# Patient Record
Sex: FEMALE | Race: WHITE | NOT HISPANIC OR LATINO | Employment: OTHER | URBAN - NONMETROPOLITAN AREA
[De-identification: names, ages, dates, MRNs, and addresses within clinical notes are randomized per-mention and may not be internally consistent; named-entity substitution may affect disease eponyms.]

---

## 2017-03-08 RX ORDER — AMLODIPINE BESYLATE 5 MG/1
TABLET ORAL
Qty: 30 TABLET | Refills: 3 | Status: SHIPPED | OUTPATIENT
Start: 2017-03-08 | End: 2017-08-03 | Stop reason: SDUPTHER

## 2017-04-13 RX ORDER — CARVEDILOL 6.25 MG/1
TABLET ORAL
Qty: 60 TABLET | Refills: 5 | Status: SHIPPED | OUTPATIENT
Start: 2017-04-13 | End: 2019-05-28

## 2017-05-15 RX ORDER — LISINOPRIL 20 MG/1
TABLET ORAL
Qty: 30 TABLET | Refills: 11 | OUTPATIENT
Start: 2017-05-15

## 2017-05-19 RX ORDER — LISINOPRIL 20 MG/1
TABLET ORAL
Qty: 30 TABLET | Refills: 11 | Status: SHIPPED | OUTPATIENT
Start: 2017-05-19 | End: 2017-09-21 | Stop reason: SDUPTHER

## 2017-08-03 RX ORDER — AMLODIPINE BESYLATE 5 MG/1
5 TABLET ORAL DAILY
Qty: 30 TABLET | Refills: 0 | Status: SHIPPED | OUTPATIENT
Start: 2017-08-03 | End: 2017-09-05 | Stop reason: SDUPTHER

## 2017-09-05 ENCOUNTER — TELEPHONE (OUTPATIENT)
Dept: INTERNAL MEDICINE | Facility: CLINIC | Age: 82
End: 2017-09-05

## 2017-09-05 RX ORDER — AMLODIPINE BESYLATE 5 MG/1
TABLET ORAL
Qty: 30 TABLET | Refills: 0 | Status: SHIPPED | OUTPATIENT
Start: 2017-09-05 | End: 2017-09-21

## 2017-09-05 RX ORDER — AMLODIPINE BESYLATE 5 MG/1
5 TABLET ORAL DAILY
Qty: 15 TABLET | Refills: 0 | Status: SHIPPED | OUTPATIENT
Start: 2017-09-05 | End: 2017-09-21 | Stop reason: SDUPTHER

## 2017-09-21 ENCOUNTER — OFFICE VISIT (OUTPATIENT)
Dept: INTERNAL MEDICINE | Facility: CLINIC | Age: 82
End: 2017-09-21

## 2017-09-21 VITALS
TEMPERATURE: 98.9 F | RESPIRATION RATE: 14 BRPM | SYSTOLIC BLOOD PRESSURE: 130 MMHG | HEIGHT: 63 IN | DIASTOLIC BLOOD PRESSURE: 80 MMHG | BODY MASS INDEX: 24.1 KG/M2 | WEIGHT: 136 LBS | HEART RATE: 74 BPM | OXYGEN SATURATION: 97 %

## 2017-09-21 DIAGNOSIS — E78.5 HYPERLIPIDEMIA, UNSPECIFIED HYPERLIPIDEMIA TYPE: ICD-10-CM

## 2017-09-21 DIAGNOSIS — N30.10 CHRONIC INTERSTITIAL CYSTITIS: ICD-10-CM

## 2017-09-21 DIAGNOSIS — I10 ESSENTIAL HYPERTENSION: ICD-10-CM

## 2017-09-21 DIAGNOSIS — K21.9 GASTROESOPHAGEAL REFLUX DISEASE WITHOUT ESOPHAGITIS: ICD-10-CM

## 2017-09-21 DIAGNOSIS — R13.14 PHARYNGOESOPHAGEAL DYSPHAGIA: ICD-10-CM

## 2017-09-21 DIAGNOSIS — M81.0 OSTEOPOROSIS: ICD-10-CM

## 2017-09-21 DIAGNOSIS — R42 DIZZINESS: ICD-10-CM

## 2017-09-21 DIAGNOSIS — I67.1 CEREBRAL ANEURYSM: Primary | ICD-10-CM

## 2017-09-21 PROCEDURE — 99214 OFFICE O/P EST MOD 30 MIN: CPT | Performed by: INTERNAL MEDICINE

## 2017-09-21 RX ORDER — AMLODIPINE BESYLATE 5 MG/1
5 TABLET ORAL DAILY
Qty: 90 TABLET | Refills: 3 | Status: SHIPPED | OUTPATIENT
Start: 2017-09-21 | End: 2018-10-05 | Stop reason: SDUPTHER

## 2017-09-21 RX ORDER — LISINOPRIL 20 MG/1
20 TABLET ORAL DAILY
Qty: 90 TABLET | Refills: 3 | Status: SHIPPED | OUTPATIENT
Start: 2017-09-21 | End: 2018-10-05 | Stop reason: SDUPTHER

## 2017-09-21 NOTE — PROGRESS NOTES
Subjective   Alberta Suarez is a 82 y.o. female.     Chief Complaint   Patient presents with   • Med Refill     here for refills        History of Present Illness   Hypertension stable on medications.  Patient self discontinued the carvedilol only uses it as needed.  Cerebral aneurysm patient declined to repeat MRI angiogram.  Hyperlipidemia on diet to stable.  osteoporosis on medication.  Dysphagia resolved.  Vitamin D low on supplement.    Current Outpatient Prescriptions:   •  amLODIPine (NORVASC) 5 MG tablet, Take 1 tablet by mouth Daily., Disp: 90 tablet, Rfl: 3  •  aspirin 81 MG EC tablet, Take  by mouth daily., Disp: , Rfl:   •  carvedilol (COREG) 6.25 MG tablet, TAKE ONE TABLET BY MOUTH TWICE A DAY, Disp: 60 tablet, Rfl: 5  •  lisinopril (PRINIVIL,ZESTRIL) 20 MG tablet, Take 1 tablet by mouth Daily., Disp: 90 tablet, Rfl: 3    The following portions of the patient's history were reviewed and updated as appropriate: allergies, current medications, past family history, past medical history, past social history, past surgical history and problem list.    Review of Systems   Constitutional: Negative.    Respiratory: Negative.    Cardiovascular: Negative.    Gastrointestinal: Negative.    Musculoskeletal: Negative.    Skin: Negative.    Neurological: Negative.    Psychiatric/Behavioral: Negative.        Objective   Physical Exam   Constitutional: She is oriented to person, place, and time. She appears well-nourished.   HENT:   Head: Normocephalic and atraumatic.   Tm bulge   Eyes: Conjunctivae are normal. Pupils are equal, round, and reactive to light.   Neck: Normal range of motion. Neck supple.   Cardiovascular: Normal rate, regular rhythm and normal heart sounds.    Pulmonary/Chest: Effort normal and breath sounds normal.   Abdominal: Bowel sounds are normal.   Neurological: She is alert and oriented to person, place, and time.   Skin: Skin is warm.   Psychiatric: She has a normal mood and affect.       All  tests have been reviewed.    Assessment/Plan   There are no diagnoses linked to this encounter.          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 Patient self d/c'd Coreg watch for now  Cerebral aneurysm decline to see neurologist. MRI negative aneurysm,  MRA two small aneurysm. Refuse to follow up , warned patient bleeding.  valvular heart disease. Obtain records.   hyperlipidemia unable to take Lipito, caused blister in mouth. continue good diet  chronic intersitial cystitis, follow up with Dr. Ramirez, s/p DMSO better.  LBP mild and watch  refuse colonscopy, mamm, again 9/21/17  flu shot patient refuses  osteoporosis on Dexa continue oscalD 500mg bid.  Dysphagia resolved after EGD dilation, last one was 6/2016   vitd low continue vitD3 1000iu daily  pneumovax done, prevnar ?done.  refuse shingle shot and Td  dysphagia resolved after dilation by GI     Followup in 3 months PE

## 2018-10-08 RX ORDER — AMLODIPINE BESYLATE 5 MG/1
5 TABLET ORAL DAILY
Qty: 90 TABLET | Refills: 3 | Status: SHIPPED | OUTPATIENT
Start: 2018-10-08 | End: 2019-05-28

## 2018-10-08 RX ORDER — LISINOPRIL 20 MG/1
20 TABLET ORAL DAILY
Qty: 90 TABLET | Refills: 3 | Status: SHIPPED | OUTPATIENT
Start: 2018-10-08 | End: 2019-05-28

## 2019-01-23 ENCOUNTER — PREP FOR SURGERY (OUTPATIENT)
Dept: OTHER | Facility: HOSPITAL | Age: 84
End: 2019-01-23

## 2019-01-23 ENCOUNTER — ANESTHESIA (OUTPATIENT)
Dept: GASTROENTEROLOGY | Facility: HOSPITAL | Age: 84
End: 2019-01-23

## 2019-01-23 ENCOUNTER — APPOINTMENT (OUTPATIENT)
Dept: GENERAL RADIOLOGY | Facility: HOSPITAL | Age: 84
End: 2019-01-23

## 2019-01-23 ENCOUNTER — HOSPITAL ENCOUNTER (EMERGENCY)
Facility: HOSPITAL | Age: 84
Discharge: HOME | End: 2019-01-23
Attending: EMERGENCY MEDICINE | Admitting: EMERGENCY MEDICINE

## 2019-01-23 ENCOUNTER — ANESTHESIA EVENT (OUTPATIENT)
Dept: GASTROENTEROLOGY | Facility: HOSPITAL | Age: 84
End: 2019-01-23

## 2019-01-23 VITALS
RESPIRATION RATE: 16 BRPM | TEMPERATURE: 97 F | OXYGEN SATURATION: 94 % | HEART RATE: 82 BPM | WEIGHT: 121 LBS | HEIGHT: 63 IN | SYSTOLIC BLOOD PRESSURE: 153 MMHG | DIASTOLIC BLOOD PRESSURE: 90 MMHG | BODY MASS INDEX: 21.44 KG/M2

## 2019-01-23 DIAGNOSIS — R13.10 DYSPHAGIA, UNSPECIFIED TYPE: ICD-10-CM

## 2019-01-23 DIAGNOSIS — R13.12 DYSPHAGIA, OROPHARYNGEAL: Primary | ICD-10-CM

## 2019-01-23 DIAGNOSIS — T18.108A FOREIGN BODY IN ESOPHAGUS, INITIAL ENCOUNTER: ICD-10-CM

## 2019-01-23 DIAGNOSIS — R13.10 DYSPHAGIA, UNSPECIFIED TYPE: Primary | ICD-10-CM

## 2019-01-23 PROCEDURE — 43249 ESOPH EGD DILATION <30 MM: CPT | Performed by: INTERNAL MEDICINE

## 2019-01-23 PROCEDURE — C1726 CATH, BAL DIL, NON-VASCULAR: HCPCS | Performed by: INTERNAL MEDICINE

## 2019-01-23 PROCEDURE — 43239 EGD BIOPSY SINGLE/MULTIPLE: CPT | Performed by: INTERNAL MEDICINE

## 2019-01-23 PROCEDURE — 25010000002 METOCLOPRAMIDE PER 10 MG: Performed by: NURSE ANESTHETIST, CERTIFIED REGISTERED

## 2019-01-23 PROCEDURE — 99284 EMERGENCY DEPT VISIT MOD MDM: CPT

## 2019-01-23 PROCEDURE — 71046 X-RAY EXAM CHEST 2 VIEWS: CPT

## 2019-01-23 PROCEDURE — 25010000002 DEXAMETHASONE PER 1 MG: Performed by: NURSE ANESTHETIST, CERTIFIED REGISTERED

## 2019-01-23 PROCEDURE — 43247 EGD REMOVE FOREIGN BODY: CPT | Performed by: INTERNAL MEDICINE

## 2019-01-23 PROCEDURE — 25010000002 PROPOFOL 200 MG/20ML EMULSION: Performed by: NURSE ANESTHETIST, CERTIFIED REGISTERED

## 2019-01-23 PROCEDURE — S0260 H&P FOR SURGERY: HCPCS | Performed by: INTERNAL MEDICINE

## 2019-01-23 PROCEDURE — 96374 THER/PROPH/DIAG INJ IV PUSH: CPT

## 2019-01-23 PROCEDURE — 25010000002 ONDANSETRON PER 1 MG: Performed by: NURSE ANESTHETIST, CERTIFIED REGISTERED

## 2019-01-23 PROCEDURE — 93005 ELECTROCARDIOGRAM TRACING: CPT | Performed by: PHYSICIAN ASSISTANT

## 2019-01-23 PROCEDURE — 25010000002 LORAZEPAM PER 2 MG: Performed by: EMERGENCY MEDICINE

## 2019-01-23 RX ORDER — DEXAMETHASONE SODIUM PHOSPHATE 4 MG/ML
INJECTION, SOLUTION INTRA-ARTICULAR; INTRALESIONAL; INTRAMUSCULAR; INTRAVENOUS; SOFT TISSUE AS NEEDED
Status: DISCONTINUED | OUTPATIENT
Start: 2019-01-23 | End: 2019-01-23 | Stop reason: SURG

## 2019-01-23 RX ORDER — PANTOPRAZOLE SODIUM 40 MG/10ML
40 INJECTION, POWDER, LYOPHILIZED, FOR SOLUTION INTRAVENOUS ONCE
Status: DISCONTINUED | OUTPATIENT
Start: 2019-01-23 | End: 2019-01-23 | Stop reason: HOSPADM

## 2019-01-23 RX ORDER — SODIUM CHLORIDE 9 MG/ML
70 INJECTION, SOLUTION INTRAVENOUS CONTINUOUS PRN
Status: DISCONTINUED | OUTPATIENT
Start: 2019-01-23 | End: 2019-01-23 | Stop reason: HOSPADM

## 2019-01-23 RX ORDER — ONDANSETRON 2 MG/ML
4 INJECTION INTRAMUSCULAR; INTRAVENOUS ONCE AS NEEDED
Status: CANCELLED | OUTPATIENT
Start: 2019-01-23 | End: 2019-01-23

## 2019-01-23 RX ORDER — PROPOFOL 10 MG/ML
INJECTION, EMULSION INTRAVENOUS AS NEEDED
Status: DISCONTINUED | OUTPATIENT
Start: 2019-01-23 | End: 2019-01-23 | Stop reason: SURG

## 2019-01-23 RX ORDER — METOCLOPRAMIDE HYDROCHLORIDE 5 MG/ML
INJECTION INTRAMUSCULAR; INTRAVENOUS AS NEEDED
Status: DISCONTINUED | OUTPATIENT
Start: 2019-01-23 | End: 2019-01-23 | Stop reason: SURG

## 2019-01-23 RX ORDER — SODIUM CHLORIDE 9 MG/ML
70 INJECTION, SOLUTION INTRAVENOUS CONTINUOUS PRN
Status: CANCELLED | OUTPATIENT
Start: 2019-01-23

## 2019-01-23 RX ORDER — SODIUM CHLORIDE 9 MG/ML
125 INJECTION, SOLUTION INTRAVENOUS CONTINUOUS
Status: DISCONTINUED | OUTPATIENT
Start: 2019-01-23 | End: 2019-01-23 | Stop reason: HOSPADM

## 2019-01-23 RX ORDER — MAGNESIUM HYDROXIDE 1200 MG/15ML
LIQUID ORAL AS NEEDED
Status: DISCONTINUED | OUTPATIENT
Start: 2019-01-23 | End: 2019-01-23 | Stop reason: HOSPADM

## 2019-01-23 RX ORDER — SODIUM CHLORIDE 0.9 % (FLUSH) 0.9 %
10 SYRINGE (ML) INJECTION AS NEEDED
Status: DISCONTINUED | OUTPATIENT
Start: 2019-01-23 | End: 2019-01-23 | Stop reason: HOSPADM

## 2019-01-23 RX ORDER — PANTOPRAZOLE SODIUM 40 MG/1
TABLET, DELAYED RELEASE ORAL
Qty: 30 TABLET | Refills: 2 | Status: SHIPPED | OUTPATIENT
Start: 2019-01-23 | End: 2019-05-28

## 2019-01-23 RX ORDER — ONDANSETRON 2 MG/ML
INJECTION INTRAMUSCULAR; INTRAVENOUS AS NEEDED
Status: DISCONTINUED | OUTPATIENT
Start: 2019-01-23 | End: 2019-01-23 | Stop reason: SURG

## 2019-01-23 RX ORDER — LORAZEPAM 2 MG/ML
0.25 INJECTION INTRAMUSCULAR ONCE
Status: COMPLETED | OUTPATIENT
Start: 2019-01-23 | End: 2019-01-23

## 2019-01-23 RX ADMIN — PROPOFOL 25 MG: 10 INJECTION, EMULSION INTRAVENOUS at 18:43

## 2019-01-23 RX ADMIN — PROPOFOL 50 MG: 10 INJECTION, EMULSION INTRAVENOUS at 18:50

## 2019-01-23 RX ADMIN — METOCLOPRAMIDE 10 MG: 5 INJECTION, SOLUTION INTRAMUSCULAR; INTRAVENOUS at 18:40

## 2019-01-23 RX ADMIN — SODIUM CHLORIDE 70 ML/HR: 9 INJECTION, SOLUTION INTRAVENOUS at 16:43

## 2019-01-23 RX ADMIN — ONDANSETRON 4 MG: 2 INJECTION INTRAMUSCULAR; INTRAVENOUS at 18:40

## 2019-01-23 RX ADMIN — SODIUM CHLORIDE: 9 INJECTION, SOLUTION INTRAVENOUS at 18:30

## 2019-01-23 RX ADMIN — PROPOFOL 50 MG: 10 INJECTION, EMULSION INTRAVENOUS at 18:37

## 2019-01-23 RX ADMIN — LIDOCAINE HYDROCHLORIDE 100 MG: 20 INJECTION, SOLUTION INTRAVENOUS at 18:37

## 2019-01-23 RX ADMIN — PROPOFOL 25 MG: 10 INJECTION, EMULSION INTRAVENOUS at 18:53

## 2019-01-23 RX ADMIN — PROPOFOL 50 MG: 10 INJECTION, EMULSION INTRAVENOUS at 18:40

## 2019-01-23 RX ADMIN — LORAZEPAM 0.25 MG: 2 INJECTION INTRAMUSCULAR; INTRAVENOUS at 14:41

## 2019-01-23 RX ADMIN — DEXAMETHASONE SODIUM PHOSPHATE 10 MG: 4 INJECTION, SOLUTION INTRAMUSCULAR; INTRAVENOUS at 18:40

## 2019-01-23 RX ADMIN — PROPOFOL 50 MG: 10 INJECTION, EMULSION INTRAVENOUS at 18:45

## 2019-01-23 NOTE — OP NOTE
PROCEDURE:  Upper Endoscopy with removal of esophageal foreign body (food bolus), serial dilation of the proximal and mid esophagus using 8-9-10 mm CRE balloon to 10 mm and biopsies.     DATE OF PROCEDURE: January 23, 2019.    REFERRING PROVIDER:  Gonzalo Garrett MD.     INSTRUMENT:  Olympus GIF H 190 video endoscope     INDICATIONS OF THE PROCEDURE:    This is an 23-year-old white female with history of esophageal foreign body sensation since last evening and dysphagia. Currently undergoing upper endoscopy for further evaluation and intervention.     BIOPSIES: Biopsies were obtained from the esophageal lesion at 22 cm. Biopsies were obtained from the proximal esophageal ulcerations.     MEDICATIONS:  MAC.     PHOTOGRAPHS:  Photographs were included in the medical records.     CONSENT/PREPROCEDURE EVALUATION:  Risks, benefits, alternatives and options of the procedure including risks of anesthesia/sedation were discussed and informed consent was obtained prior to the procedure. History and physical examination were performed and nothing precluded the test.     REPORT:  The patient was placed in left lateral decubitus position. Once under the influence of IV sedation, the instrument was inserted into the mouth and esophagus was intubated under direct vision without difficulty.      Esophagus and intervention: Visualized portions of the laryngopharyngeal areas including partial view of the vocal cords appeared to be within normal limits. A food bolus was noted within the proximal esophagus. Tight stricture was seen. 3 prong foreign body retrieving forceps was initially used however it was rather difficult to hold the foreign body. This moved down. Multiple rings and strictures were seen within the esophagus distal to the proximal stricture. Ulcerations were seen proximal to the proximal esophageal stricture. Biopsies were obtained at the conclusion.  The proximal esophageal stricture in the mid esophageal strictures  were dilated using 8-9-10 mm CRE balloon to 10 mm serially. Scope was then maneuvered distal to the stricture. At 22 cm a polypoid ulcerated mass lesion was noted. This lesion was noted to extend to 25 cm. Multiple biopsies were obtained at the conclusion of the procedure. Food bolus was then easily pushed into the stomach. A nonobstructive Schatzki's-type ring was noted. Ulceration was also seen in the mid esophagus.  Z line was noted to be around 30  cm.   A small sliding hiatal hernia less than 3 cm was noted.  Erythematous-erosive distal esophagitis was seen.     Stomach:  Antrum:  Erythematous gastritis.  Angulus, lesser and greater curves: Normal.  Retroflex examination: Sliding hiatal hernia.  Cardia and fundus:  Normal.     Body of the stomach: Erythematous gastritis.  Good distensibility of the stomach was achieved no giant folds were noted.   Biopsies were obtained from the gastric antrum,  body and fundus.    Pylorus and pyloric channel:  normal.     Duodenum:  Bulb: Normal.  Second portion: normal.  No scalloping was seen in the second portion of duodenum.  Biopsies were obtained from the second portion of duodenum.       The upper GI tract was decompressed and the scope was pulled out of the patient. The patient tolerated the procedure well.     DIAGNOSES:     1. Esophageal foreign body-food bolus status post removal.  2. Proximal and mid esophageal strictures status post serial dilation to 10 mm.  3. 3 cm ulcerated polypoid mass lesion. Proximal edge at around 22 cm and distal at 25 cm.  4. Proximal esophageal ulcerations. Mid esophageal ulceration.  5. Small sliding hiatal hernia less than 3 cm.  6. Nonobstructive Schatzki's-type ring.  7. Multiple concentric rings involving the esophagus.  8. Erythematous gastritis.      RECOMMENDATIONS:  1.  Dietary instructions.   2.  Pantoprazole 40 mg 1 p.o. q.a.m. 1/2 hour before breakfast.  3.  Follow biopsies.  4.  Follow up in office.  5. The patient will  need further evaluation and intervention in the future.     Thank you very much for letting me participate in the care of this patient. Please do not hesitate to call me if you have any questions.

## 2019-01-23 NOTE — ANESTHESIA PREPROCEDURE EVALUATION
Anesthesia Evaluation     Patient summary reviewed and Nursing notes reviewed   NPO Solid Status: > 8 hours  NPO Liquid Status: > 8 hours           Airway   Mallampati: I  TM distance: >3 FB  Neck ROM: full  No difficulty expected  Dental - normal exam   (+) upper dentures and lower dentures    Pulmonary - negative pulmonary ROS and normal exam   Cardiovascular - normal exam  Exercise tolerance: good (4-7 METS)    ECG reviewed  Patient on routine beta blocker    (+) hypertension, PVD, hyperlipidemia,       Neuro/Psych  (+) dizziness/light headedness,     GI/Hepatic/Renal/Endo    (+)  GERD,      Musculoskeletal     (+) neck pain,   Abdominal  - normal exam    Bowel sounds: normal.   Substance History - negative use     OB/GYN negative ob/gyn ROS         Other        ROS/Med Hx Other: Food bolus occurred at 1700 yesterday      CXR- within normal limits                  Anesthesia Plan    ASA 2     general     intravenous induction

## 2019-01-23 NOTE — ED PROVIDER NOTES
"Subjective   Patient is here with complaint of choking sensation, states she has had trouble with esophageal irritation, previous EGD with dilatation procedure described by patient per Dr. Schultz,.. Apparently after eating some beef stew last night feels like some of this to get \"stuck\"... She has had trouble swallowing since then describes regurgitation when she tries to drink... No reported chest pain shortness of air no abdominal pain no vomiting        History provided by:  Patient and friend      Review of Systems   Constitutional: Positive for appetite change.   HENT: Negative.    Eyes: Negative.    Respiratory: Positive for cough and choking. Negative for shortness of breath.    Cardiovascular: Negative.  Negative for chest pain.   Gastrointestinal: Negative for abdominal pain and vomiting.   Musculoskeletal: Positive for arthralgias.   Skin: Negative.    Neurological: Negative.    Psychiatric/Behavioral: Negative.    All other systems reviewed and are negative.      Past Medical History:   Diagnosis Date   • History of blood transfusion    • Hypertension    • Vertigo        Allergies   Allergen Reactions   • Atorvastatin Other (See Comments)     Blisters in mouth   • Other        Past Surgical History:   Procedure Laterality Date   • APPENDECTOMY  approx    •  SECTION     • DENTAL PROCEDURE     • HYSTERECTOMY  [id number]   • TOTAL KNEE ARTHROPLASTY  approx        Family History   Problem Relation Age of Onset   • Colon cancer Neg Hx        Social History     Socioeconomic History   • Marital status:      Spouse name: Not on file   • Number of children: Not on file   • Years of education: Not on file   • Highest education level: Not on file   Tobacco Use   • Smoking status: Never Smoker   • Smokeless tobacco: Never Used   Substance and Sexual Activity   • Alcohol use: No   • Drug use: No           Objective   Physical Exam   Constitutional: She appears well-developed and " well-nourished.   Afebrile nontoxic no acute distress   HENT:   Head: Normocephalic and atraumatic.   Mouth/Throat: Oropharynx is clear and moist. No oropharyngeal exudate.   Eyes: Conjunctivae and EOM are normal. Pupils are equal, round, and reactive to light.   Neck: Normal range of motion. Neck supple.   Cardiovascular: Normal rate, regular rhythm and intact distal pulses.   Pulmonary/Chest: Effort normal and breath sounds normal. No stridor. She has no wheezes.   Abdominal: Soft. There is no tenderness.   Musculoskeletal: Normal range of motion.   Lymphadenopathy:     She has no cervical adenopathy.   Neurological: She is alert.   Skin: Skin is warm and dry. Capillary refill takes less than 2 seconds.   Psychiatric: She has a normal mood and affect. Her behavior is normal. Judgment and thought content normal.   Nursing note and vitals reviewed.      Procedures           ED Course  ED Course as of Jan 23 1613   Wed Jan 23, 2019   1446 Paged and left message with Dr Schultz nurse..he will call back  [SC]   1456 EKG interpreted by me: sinus tachycardia, normal axis/intervals, no acute ST/T changes, low voltage QRS complexes, this is an abnormal EKG  [MP]   1458  callback he will look into seeing if there is OR time available around 4 or 5 today and will call me back  [SC]   1608 Patient has been seen by anesthesia planning on going to the OR  [SC]   1612 Patient still with inability to swallow  [SC]      ED Course User Index  [MP] Bernabe Long MD  [SC] Sheng Gayle, JANA                  MDM  Number of Diagnoses or Management Options     Amount and/or Complexity of Data Reviewed  Review and summarize past medical records: yes  Discuss the patient with other providers: yes    Risk of Complications, Morbidity, and/or Mortality  Presenting problems: moderate  Diagnostic procedures: low  Management options: low    Patient Progress  Patient progress: stable        Final diagnoses:   Dysphagia,  oropharyngeal            Sheng Gayle PA-C  01/23/19 1611       Sheng Gayle PA-C  01/23/19 1612       Sheng Gayle PA-C  01/23/19 1614

## 2019-01-23 NOTE — H&P
Chief complaint:  Dysphagia and foreign body sensation since last night.    History of present illness:   The patient was eating meat last evening when she had feeling off for letting stop. Since then the patient is unable to eat or drink. She has not been able to swallow her saliva.    Past medical history: Previously, the patient had such episodes in the past. In 2016 endoscopic intervention was undertaken to remove the esophageal foreign body in the proximal esophagus. In  the patient had dilation of the esophagus. 11 mm. She has concentric rings of the esophagus. However, biopsies in the past did not reveal eosinophilic esophagitis.    Past Medical History:   Diagnosis Date   • History of blood transfusion    • Hypertension    • Vertigo        Surgical history:    Past Surgical History:   Procedure Laterality Date   • APPENDECTOMY  approx    •  SECTION     • DENTAL PROCEDURE     • HYSTERECTOMY  [id number]   • TOTAL KNEE ARTHROPLASTY  approx        Social history:  Social History     Socioeconomic History   • Marital status:      Spouse name: Not on file   • Number of children: Not on file   • Years of education: Not on file   • Highest education level: Not on file   Social Needs   • Financial resource strain: Not on file   • Food insecurity - worry: Not on file   • Food insecurity - inability: Not on file   • Transportation needs - medical: Not on file   • Transportation needs - non-medical: Not on file   Occupational History   • Not on file   Tobacco Use   • Smoking status: Never Smoker   • Smokeless tobacco: Never Used   Substance and Sexual Activity   • Alcohol use: No   • Drug use: No   • Sexual activity: Defer   Other Topics Concern   • Not on file   Social History Narrative   • Not on file       Allergies:  Atorvastatin and Other  Latex allergy: None  Contrast allergy: None    Medications:  Medications Prior to Admission   Medication Sig Dispense Refill Last Dose   •  "amLODIPine (NORVASC) 5 MG tablet TAKE 1 TABLET BY MOUTH DAILY. 90 tablet 3 1/22/2019 at 0800   • aspirin 81 MG EC tablet Take  by mouth daily.   1/22/2019 at 0800   • carvedilol (COREG) 6.25 MG tablet TAKE ONE TABLET BY MOUTH TWICE A DAY 60 tablet 5 Past Week at Unknown time   • lisinopril (PRINIVIL,ZESTRIL) 20 MG tablet TAKE 1 TABLET BY MOUTH DAILY. 90 tablet 3 Past Week at Unknown time       Review of systems:   Constitutional: No recent: Fever, Weight loss,   Respiratory: No recent: SOB, Cough,   Cardiovascular: No recent: Chest Pains, congestive heart failure or arrhythmias.  Neurological: No recent: Seizures, CVA, TIA.   Genitourinary: No recent: Renal Failure, UTI.  Endocrine: No recent: Worsening of diabetes or thyroid disease.  Musculoskeletal: No recent: Joint swelling.  Hem. Oncology: No recent: Anemia or bleeding.  Psychiatric: No recent: Worsening of depression or anxiety.     VITAL SIGNS:    Blood pressure 153/78, pulse 87, temperature 98.2 °F (36.8 °C), temperature source Oral, resp. rate 18, height 160 cm (63\"), weight 54.9 kg (121 lb), SpO2 95 %.    PHYSICAL EXAMINATION:   HEENT: Normal. Oral mucosa dry.  Lungs: Clear to auscultation.  Heart: No S3, no murmur.    Abdomen: Soft.  BS+ ND, NT  Extremities: No edema.  No cyanosis.  Neuro: Alert X 3. No focal deficit.    Assessment: Esophageal foreign body sensation. Dysphagia.    Plan:   Upper endoscopy-EGD.    Risks/Benefits:  The potential benefits, risk and/or side effects of the procedure and alternatives have been discussed with the patient/authorized representative and questions were answered.    "

## 2019-01-24 NOTE — DISCHARGE INSTRUCTIONS
No pushing, pulling, tugging,  heavy lifting, or strenuous activity.  No major decision making, driving, or drinking alcoholic beverages for 24 hours. ( due to the medications you have  received)  Always use good hand hygiene/washing techniques.  NO driving while taking pain medications.To assist you in voiding:  Drink plenty of fluids  Listen to running water while attempting to void.    If you are unable to urinate and you have an uncomfortable urge to void or it has been   6 hours since you were discharged, return to the Emergency Room      ************************************************************************************    Postprocedure instructions:  1. Nothing by mouth until fully alert and as specified below .  2. Bedrest until fully alert.  3. Vital signs as routine.    Diet:   1. Nothing by mouth for 60 minutes, then if no chest pains the patient may have Clear liquids diet (No Sodas) for overnight.   2. May advance to full liquid diet in the morning at 6 AM January 24, 2019 if no chest pains nausea vomiting or bleeding.  3. She should eat only full liquid diet.    Other instructions:  1. The patient may eat in upright position, chew well, take small bites and take medications in upright position.   2. The patient should drink water after 3-4 bites, and liberally with medications.   3. The patient should remain upright for about 10 minutes after eating and taking oral medications.      Blood Thinner and other medications Directions:  Avoid Aspirin & other NSAIDS for 7 days.  Tylenol is okay.    Other instructions:  The patient should avoid medications that have a potential to cause pill esophagitis including potassium pills, tetracycline capsules, iron pills, NSAIDs and bisphosphonates.      Follow-up:    DR. XENIA RUANO in 1 week.Office phone #  [phone].      ********************************************************************************************************************************************************    Notes to the patient and the family from Dr. Schultz.     Dear patient/family member,    Findings on today's procedure are as follows:    1. Esophageal stricture-scarring. This was stretched to 10 mm.   2. Foreign body from the esophagus was removed.   3. Inflammation of the esophagus.   4. Ulceration of the esophagus.   5. Ulcerated polypoid lesion in the esophagus.   6. Inflammation of the stomach. Gastritis.  7. Small sliding hiatal hernia.      Recommendations:    Protonix (Pantoprazole) tablet 40 mg tablet. Take 1 tablet orally in the morning half an hour before eating every day.  Other instructions as above.      Should you have more questions please do not hesitate to talk to the nurse who can call me and let me talk to you.     I hope you feel better.    Rajesh Schultz M.D., FACP, FACG.

## 2019-01-30 LAB
LAB AP CASE REPORT: NORMAL
PATH REPORT.FINAL DX SPEC: NORMAL

## 2019-02-14 ENCOUNTER — APPOINTMENT (OUTPATIENT)
Dept: CT IMAGING | Facility: HOSPITAL | Age: 84
End: 2019-02-14

## 2019-02-14 ENCOUNTER — HOSPITAL ENCOUNTER (EMERGENCY)
Facility: HOSPITAL | Age: 84
Discharge: HOME | End: 2019-02-14
Attending: EMERGENCY MEDICINE | Admitting: EMERGENCY MEDICINE

## 2019-02-14 ENCOUNTER — APPOINTMENT (OUTPATIENT)
Dept: GENERAL RADIOLOGY | Facility: HOSPITAL | Age: 84
End: 2019-02-14

## 2019-02-14 VITALS
BODY MASS INDEX: 22.15 KG/M2 | WEIGHT: 125 LBS | DIASTOLIC BLOOD PRESSURE: 80 MMHG | RESPIRATION RATE: 18 BRPM | HEART RATE: 86 BPM | TEMPERATURE: 97.6 F | SYSTOLIC BLOOD PRESSURE: 154 MMHG | HEIGHT: 63 IN | OXYGEN SATURATION: 95 %

## 2019-02-14 DIAGNOSIS — R07.9 CHEST PAIN, UNSPECIFIED TYPE: ICD-10-CM

## 2019-02-14 DIAGNOSIS — M25.551 PAIN OF RIGHT HIP JOINT: Primary | ICD-10-CM

## 2019-02-14 LAB
ALBUMIN SERPL-MCNC: 4.5 G/DL (ref 3.5–5)
ALBUMIN/GLOB SERPL: 1.3 G/DL (ref 1–2)
ALP SERPL-CCNC: 100 U/L (ref 38–126)
ALT SERPL W P-5'-P-CCNC: 12 U/L (ref 13–69)
ANION GAP SERPL CALCULATED.3IONS-SCNC: 12.9 MMOL/L (ref 10–20)
AST SERPL-CCNC: 20 U/L (ref 15–46)
BACTERIA UR QL AUTO: ABNORMAL /HPF
BASOPHILS # BLD AUTO: 0.04 10*3/MM3 (ref 0–0.2)
BASOPHILS NFR BLD AUTO: 0.6 % (ref 0–2.5)
BILIRUB SERPL-MCNC: 0.5 MG/DL (ref 0.2–1.3)
BILIRUB UR QL STRIP: NEGATIVE
BUN BLD-MCNC: 14 MG/DL (ref 7–20)
BUN/CREAT SERPL: 17.5 (ref 7.1–23.5)
CALCIUM SPEC-SCNC: 9.6 MG/DL (ref 8.4–10.2)
CHLORIDE SERPL-SCNC: 105 MMOL/L (ref 98–107)
CLARITY UR: CLEAR
CO2 SERPL-SCNC: 27 MMOL/L (ref 26–30)
COLOR UR: YELLOW
CREAT BLD-MCNC: 0.8 MG/DL (ref 0.6–1.3)
DEPRECATED RDW RBC AUTO: 46.5 FL (ref 37–54)
EOSINOPHIL # BLD AUTO: 0.06 10*3/MM3 (ref 0–0.7)
EOSINOPHIL NFR BLD AUTO: 0.8 % (ref 0–7)
ERYTHROCYTE [DISTWIDTH] IN BLOOD BY AUTOMATED COUNT: 13.5 % (ref 11.5–14.5)
GFR SERPL CREATININE-BSD FRML MDRD: 69 ML/MIN/1.73
GLOBULIN UR ELPH-MCNC: 3.5 GM/DL
GLUCOSE BLD-MCNC: 110 MG/DL (ref 74–98)
GLUCOSE UR STRIP-MCNC: NEGATIVE MG/DL
HCT VFR BLD AUTO: 39.7 % (ref 37–47)
HGB BLD-MCNC: 13.2 G/DL (ref 12–16)
HGB UR QL STRIP.AUTO: ABNORMAL
HYALINE CASTS UR QL AUTO: ABNORMAL /LPF
IMM GRANULOCYTES # BLD AUTO: 0.02 10*3/MM3 (ref 0–0.06)
IMM GRANULOCYTES NFR BLD AUTO: 0.3 % (ref 0–0.6)
KETONES UR QL STRIP: NEGATIVE
LEUKOCYTE ESTERASE UR QL STRIP.AUTO: NEGATIVE
LIPASE SERPL-CCNC: 134 U/L (ref 23–300)
LYMPHOCYTES # BLD AUTO: 1.19 10*3/MM3 (ref 0.6–3.4)
LYMPHOCYTES NFR BLD AUTO: 16.8 % (ref 10–50)
MCH RBC QN AUTO: 30.4 PG (ref 27–31)
MCHC RBC AUTO-ENTMCNC: 33.2 G/DL (ref 30–37)
MCV RBC AUTO: 91.5 FL (ref 81–99)
MONOCYTES # BLD AUTO: 0.76 10*3/MM3 (ref 0–0.9)
MONOCYTES NFR BLD AUTO: 10.7 % (ref 0–12)
NEUTROPHILS # BLD AUTO: 5.03 10*3/MM3 (ref 2–6.9)
NEUTROPHILS NFR BLD AUTO: 70.8 % (ref 37–80)
NITRITE UR QL STRIP: NEGATIVE
NRBC BLD AUTO-RTO: 0 /100 WBC (ref 0–0)
PH UR STRIP.AUTO: 7.5 [PH] (ref 5–8)
PLATELET # BLD AUTO: 267 10*3/MM3 (ref 130–400)
PMV BLD AUTO: 10.1 FL (ref 6–12)
POTASSIUM BLD-SCNC: 3.9 MMOL/L (ref 3.5–5.1)
PROT SERPL-MCNC: 8 G/DL (ref 6.3–8.2)
PROT UR QL STRIP: NEGATIVE
RBC # BLD AUTO: 4.34 10*6/MM3 (ref 4.2–5.4)
RBC # UR: ABNORMAL /HPF
REF LAB TEST METHOD: ABNORMAL
SODIUM BLD-SCNC: 141 MMOL/L (ref 137–145)
SP GR UR STRIP: 1.01 (ref 1–1.03)
SQUAMOUS #/AREA URNS HPF: ABNORMAL /HPF
TROPONIN I SERPL-MCNC: <0.012 NG/ML (ref 0–0.03)
UROBILINOGEN UR QL STRIP: ABNORMAL
WBC NRBC COR # BLD: 7.1 10*3/MM3 (ref 4.8–10.8)
WBC UR QL AUTO: ABNORMAL /HPF

## 2019-02-14 PROCEDURE — 74176 CT ABD & PELVIS W/O CONTRAST: CPT

## 2019-02-14 PROCEDURE — 96374 THER/PROPH/DIAG INJ IV PUSH: CPT

## 2019-02-14 PROCEDURE — 81001 URINALYSIS AUTO W/SCOPE: CPT | Performed by: EMERGENCY MEDICINE

## 2019-02-14 PROCEDURE — 96361 HYDRATE IV INFUSION ADD-ON: CPT

## 2019-02-14 PROCEDURE — 71046 X-RAY EXAM CHEST 2 VIEWS: CPT

## 2019-02-14 PROCEDURE — 85025 COMPLETE CBC W/AUTO DIFF WBC: CPT | Performed by: EMERGENCY MEDICINE

## 2019-02-14 PROCEDURE — 25010000002 MORPHINE PER 10 MG: Performed by: EMERGENCY MEDICINE

## 2019-02-14 PROCEDURE — 84484 ASSAY OF TROPONIN QUANT: CPT | Performed by: EMERGENCY MEDICINE

## 2019-02-14 PROCEDURE — 93005 ELECTROCARDIOGRAM TRACING: CPT

## 2019-02-14 PROCEDURE — 99284 EMERGENCY DEPT VISIT MOD MDM: CPT

## 2019-02-14 PROCEDURE — 72131 CT LUMBAR SPINE W/O DYE: CPT

## 2019-02-14 PROCEDURE — 80053 COMPREHEN METABOLIC PANEL: CPT | Performed by: EMERGENCY MEDICINE

## 2019-02-14 PROCEDURE — 83690 ASSAY OF LIPASE: CPT | Performed by: EMERGENCY MEDICINE

## 2019-02-14 RX ORDER — NAPROXEN 250 MG/1
250 TABLET ORAL 2 TIMES DAILY PRN
Qty: 14 TABLET | Refills: 0 | Status: SHIPPED | OUTPATIENT
Start: 2019-02-14 | End: 2019-02-14 | Stop reason: ALTCHOICE

## 2019-02-14 RX ORDER — ALUMINA, MAGNESIA, AND SIMETHICONE 2400; 2400; 240 MG/30ML; MG/30ML; MG/30ML
15 SUSPENSION ORAL ONCE
Status: COMPLETED | OUTPATIENT
Start: 2019-02-14 | End: 2019-02-14

## 2019-02-14 RX ORDER — MORPHINE SULFATE 4 MG/ML
4 INJECTION, SOLUTION INTRAMUSCULAR; INTRAVENOUS ONCE
Status: COMPLETED | OUTPATIENT
Start: 2019-02-14 | End: 2019-02-14

## 2019-02-14 RX ORDER — TRAMADOL HYDROCHLORIDE 50 MG/1
50 TABLET ORAL EVERY 6 HOURS PRN
Qty: 10 TABLET | Refills: 0 | Status: SHIPPED | OUTPATIENT
Start: 2019-02-14 | End: 2019-02-14 | Stop reason: ALTCHOICE

## 2019-02-14 RX ADMIN — SODIUM CHLORIDE 1000 ML: 9 INJECTION, SOLUTION INTRAVENOUS at 09:31

## 2019-02-14 RX ADMIN — ALUMINUM HYDROXIDE, MAGNESIUM HYDROXIDE, AND DIMETHICONE 15 ML: 400; 400; 40 SUSPENSION ORAL at 09:32

## 2019-02-14 RX ADMIN — LIDOCAINE HYDROCHLORIDE 15 ML: 20 SOLUTION ORAL; TOPICAL at 09:34

## 2019-02-14 RX ADMIN — MORPHINE SULFATE 2 MG: 4 INJECTION, SOLUTION INTRAMUSCULAR; INTRAVENOUS at 10:57

## 2019-02-14 NOTE — ED PROVIDER NOTES
TRIAGE CHIEF COMPLAINT:     Nursing and triage notes reviewed    Chief Complaint   Patient presents with   • Chest Pain   • Hip Pain      HPI: Alberta Suarez is a 83 y.o. female who presents to the emergency department complaining of chest discomfort as well as right hip and side pain.  Patient states she had an EGD done for a food bolus stuck in her esophagus 2 weeks previously by Dr. Zapata, the local GI physician.  She states she also had a dilation of her esophagus at that time.  Patient states she's been having some discomfort in the center of her chest since that time.  Pain is constant and nonradiating and hurts worse when she tries to swallow.  She has been eating and drinking less due to the discomfort.  She's not had vomiting or diarrhea.  She states for several weeks she has had discomfort around her right hip and right lower back.  Patient states the pain typically seems worse when she lays down and states the pain, and goes throughout the day.  She states sometimes it will hurt so bad in the area that she breaks out in a sweat.  She denies any pain or burning with urination.  No hematuria.  She denies any shortness of breath, fevers, chills.    REVIEW OF SYSTEMS: All other systems reviewed and are negative     PAST MEDICAL HISTORY:   Past Medical History:   Diagnosis Date   • History of blood transfusion    • Hypertension    • Vertigo         FAMILY HISTORY:   Family History   Problem Relation Age of Onset   • Colon cancer Neg Hx         SOCIAL HISTORY:   Social History     Socioeconomic History   • Marital status:      Spouse name: Not on file   • Number of children: Not on file   • Years of education: Not on file   • Highest education level: Not on file   Social Needs   • Financial resource strain: Not on file   • Food insecurity - worry: Not on file   • Food insecurity - inability: Not on file   • Transportation needs - medical: Not on file   • Transportation needs - non-medical: Not on file    Occupational History   • Not on file   Tobacco Use   • Smoking status: Never Smoker   • Smokeless tobacco: Never Used   Substance and Sexual Activity   • Alcohol use: No   • Drug use: No   • Sexual activity: Defer   Other Topics Concern   • Not on file   Social History Narrative   • Not on file        SURGICAL HISTORY:   Past Surgical History:   Procedure Laterality Date   • APPENDECTOMY  approx    •  SECTION     • DENTAL PROCEDURE     • ENDOSCOPY N/A 2019    Procedure: ESOPHAGOGASTRODUODENOSCOPY with dilation, removal foreign body and cold forcep biopsies;  Surgeon: Rajesh Schultz MD;  Location: [facility] ENDOSCOPY;  Service: Gastroenterology   • HYSTERECTOMY  [id number]   • TOTAL KNEE ARTHROPLASTY  approx         CURRENT MEDICATIONS:      Medication List      ASK your doctor about these medications    amLODIPine 5 MG tablet  Commonly known as:  NORVASC  TAKE 1 TABLET BY MOUTH DAILY.     carvedilol 6.25 MG tablet  Commonly known as:  COREG  TAKE ONE TABLET BY MOUTH TWICE A DAY     lisinopril 20 MG tablet  Commonly known as:  PRINIVIL,ZESTRIL  TAKE 1 TABLET BY MOUTH DAILY.     pantoprazole 40 MG EC tablet  Commonly known as:  PROTONIX  Take 1 tablet by mouth 30 minutes before breakfast daily.           ALLERGIES: Atorvastatin and Other     PHYSICAL EXAM:   VITAL SIGNS:   Vitals:    19 0841   BP: 150/82   Pulse: 94   Resp: 20   Temp: 97.5 °F (36.4 °C)   SpO2: 96%      CONSTITUTIONAL: Awake, oriented, appears non-toxic   HENT: Atraumatic, normocephalic, oral mucosa pink and moist, airway patent.  EYES: Conjunctiva clear  NECK: Trachea midline, non-tender, supple   CARDIOVASCULAR: Normal heart rate, Normal rhythm, No murmurs, rubs, gallops   PULMONARY/CHEST: Clear to auscultation, no rhonchi, wheezes, or rales. Symmetrical breath sounds.  ABDOMINAL: Non-distended, soft, non-tender - no rebound or guarding.  BACK: No tenderness along the lower back, no step-offs or deformities of the spine  are appreciated.  There is slight discomfort with range of motion of the right hip.  No tenderness to palpation over the right greater trochanter.  NEUROLOGIC: Non-focal, moving all four extremities, no gross sensory or motor deficits.   EXTREMITIES: No clubbing, cyanosis, or edema distal pulses and sensation are intact.   SKIN: Warm, Dry, No erythema, No rash     ED COURSE / MEDICAL DECISION MAKING:   Alberta Suarez is a 83 y.o. female who presents to the emergency department for evaluation of multiple complaints.  Patient is nondistressed on arrival in the emergency department.  Vital signs are stable on arrival.  Exam does reveal some mild discomfort to range of motion of the right hip although this does not appear severe currently.  We'll obtain labs, imaging, EKG for further evaluation of patient's symptoms.  EKG interpreted by me reveals sinus rhythm with a rate of 84 bpm.  There are occasional premature ventricular complex.  No acute ST segment or T-wave changes.  This is a normal-appearing EKG.  chest x-ray does not reveal any acute abnormalities.  CT scan the abdomen and pelvis is unremarkable.  Patient does have degenerative changes in her lower back as seen on imaging.  Laboratory tests are unremarkable including cardiac enzymes.  With the amount of time patient has had discomfort I don't feel repeat cardiac enzymes are indicated and I believe there is low suspicion for coronary disease.  I believe patient's pain is likely secondary to recent procedure.  Patient is able to eat and drink in the emergency department without difficulty.  Had some improvement in her discomfort with treatment of pain in the emergency department.  Will refer to orthopedics for further evaluation of her hip and back pain.  Patient already has follow-up with her GI physician.  Return precautions were discussed.    DECISION TO DISCHARGE/ADMIT: see ED care timeline     FINAL IMPRESSION:   1 -- hip pain   2 -- back pain  3 -- chest  pain    Electronically signed by: Danica Coe MD, 2/14/2019 9:03 AM       Danica Coe MD  02/14/19 1130

## 2019-02-14 NOTE — ED NOTES
"Patient and daughter did not want the prescription for Hycet.  Daughter stated that \"the hydrocodone is too strong so we will just take the tramadol and naproxen.\"  Patient also stated that she did not want the prescription for Hycet and that they would just crush the tramadol and put it in pudding.  Patient given rx for tramadol and naproxen.      Madhavi Hester RN  02/14/19 9116    "

## 2019-05-11 ENCOUNTER — APPOINTMENT (OUTPATIENT)
Dept: CT IMAGING | Facility: HOSPITAL | Age: 84
End: 2019-05-11

## 2019-05-11 ENCOUNTER — HOSPITAL ENCOUNTER (EMERGENCY)
Facility: HOSPITAL | Age: 84
Discharge: HOME | End: 2019-05-11
Attending: EMERGENCY MEDICINE | Admitting: EMERGENCY MEDICINE

## 2019-05-11 VITALS
BODY MASS INDEX: 21.26 KG/M2 | OXYGEN SATURATION: 97 % | HEART RATE: 83 BPM | DIASTOLIC BLOOD PRESSURE: 69 MMHG | SYSTOLIC BLOOD PRESSURE: 126 MMHG | WEIGHT: 120 LBS | TEMPERATURE: 98 F | RESPIRATION RATE: 18 BRPM

## 2019-05-11 DIAGNOSIS — N39.0 URINARY TRACT INFECTION WITHOUT HEMATURIA, SITE UNSPECIFIED: ICD-10-CM

## 2019-05-11 DIAGNOSIS — R10.9 FLANK PAIN: Primary | ICD-10-CM

## 2019-05-11 LAB
ALBUMIN SERPL-MCNC: 4.4 G/DL (ref 3.5–5)
ALBUMIN/GLOB SERPL: 1 G/DL (ref 1–2)
ALP SERPL-CCNC: 123 U/L (ref 38–126)
ALT SERPL W P-5'-P-CCNC: <6 U/L (ref 13–69)
ANION GAP SERPL CALCULATED.3IONS-SCNC: 21.5 MMOL/L (ref 10–20)
AST SERPL-CCNC: 23 U/L (ref 15–46)
BACTERIA UR QL AUTO: ABNORMAL /HPF
BASOPHILS # BLD AUTO: 0.04 10*3/MM3 (ref 0–0.2)
BASOPHILS NFR BLD AUTO: 0.4 % (ref 0–1.5)
BILIRUB SERPL-MCNC: 0.5 MG/DL (ref 0.2–1.3)
BILIRUB UR QL STRIP: NEGATIVE
BUN BLD-MCNC: 20 MG/DL (ref 7–20)
BUN/CREAT SERPL: 22.2 (ref 7.1–23.5)
CALCIUM SPEC-SCNC: 10 MG/DL (ref 8.4–10.2)
CHLORIDE SERPL-SCNC: 98 MMOL/L (ref 98–107)
CLARITY UR: ABNORMAL
CO2 SERPL-SCNC: 23 MMOL/L (ref 26–30)
COLOR UR: YELLOW
CREAT BLD-MCNC: 0.9 MG/DL (ref 0.6–1.3)
DEPRECATED RDW RBC AUTO: 42.7 FL (ref 37–54)
EOSINOPHIL # BLD AUTO: 0.14 10*3/MM3 (ref 0–0.4)
EOSINOPHIL NFR BLD AUTO: 1.5 % (ref 0.3–6.2)
ERYTHROCYTE [DISTWIDTH] IN BLOOD BY AUTOMATED COUNT: 12.9 % (ref 12.3–15.4)
GFR SERPL CREATININE-BSD FRML MDRD: 60 ML/MIN/1.73
GLOBULIN UR ELPH-MCNC: 4.2 GM/DL
GLUCOSE BLD-MCNC: 126 MG/DL (ref 74–98)
GLUCOSE UR STRIP-MCNC: NEGATIVE MG/DL
HCT VFR BLD AUTO: 39.3 % (ref 34–46.6)
HGB BLD-MCNC: 13.2 G/DL (ref 12–15.9)
HGB UR QL STRIP.AUTO: NEGATIVE
HOLD SPECIMEN: NORMAL
HYALINE CASTS UR QL AUTO: ABNORMAL /LPF
IMM GRANULOCYTES # BLD AUTO: 0.03 10*3/MM3 (ref 0–0.05)
IMM GRANULOCYTES NFR BLD AUTO: 0.3 % (ref 0–0.5)
KETONES UR QL STRIP: ABNORMAL
LEUKOCYTE ESTERASE UR QL STRIP.AUTO: ABNORMAL
LIPASE SERPL-CCNC: 134 U/L (ref 23–300)
LYMPHOCYTES # BLD AUTO: 2.54 10*3/MM3 (ref 0.7–3.1)
LYMPHOCYTES NFR BLD AUTO: 26.5 % (ref 19.6–45.3)
MCH RBC QN AUTO: 30.3 PG (ref 26.6–33)
MCHC RBC AUTO-ENTMCNC: 33.6 G/DL (ref 31.5–35.7)
MCV RBC AUTO: 90.1 FL (ref 79–97)
MONOCYTES # BLD AUTO: 0.91 10*3/MM3 (ref 0.1–0.9)
MONOCYTES NFR BLD AUTO: 9.5 % (ref 5–12)
NEUTROPHILS # BLD AUTO: 5.94 10*3/MM3 (ref 1.7–7)
NEUTROPHILS NFR BLD AUTO: 61.8 % (ref 42.7–76)
NITRITE UR QL STRIP: POSITIVE
NRBC BLD AUTO-RTO: 0 /100 WBC (ref 0–0.2)
PH UR STRIP.AUTO: 8 [PH] (ref 5–8)
PLATELET # BLD AUTO: 370 10*3/MM3 (ref 140–450)
PMV BLD AUTO: 9.7 FL (ref 6–12)
POTASSIUM BLD-SCNC: 4.5 MMOL/L (ref 3.5–5.1)
PROT SERPL-MCNC: 8.6 G/DL (ref 6.3–8.2)
PROT UR QL STRIP: NEGATIVE
RBC # BLD AUTO: 4.36 10*6/MM3 (ref 3.77–5.28)
RBC # UR: ABNORMAL /HPF
REF LAB TEST METHOD: ABNORMAL
SODIUM BLD-SCNC: 138 MMOL/L (ref 137–145)
SP GR UR STRIP: 1.01 (ref 1–1.03)
SQUAMOUS #/AREA URNS HPF: ABNORMAL /HPF
UROBILINOGEN UR QL STRIP: ABNORMAL
WBC CLUMPS # UR AUTO: ABNORMAL /HPF
WBC NRBC COR # BLD: 9.6 10*3/MM3 (ref 3.4–10.8)
WBC UR QL AUTO: ABNORMAL /HPF
WHOLE BLOOD HOLD SPECIMEN: NORMAL
WHOLE BLOOD HOLD SPECIMEN: NORMAL

## 2019-05-11 PROCEDURE — 81001 URINALYSIS AUTO W/SCOPE: CPT | Performed by: EMERGENCY MEDICINE

## 2019-05-11 PROCEDURE — 25010000002 ONDANSETRON PER 1 MG: Performed by: EMERGENCY MEDICINE

## 2019-05-11 PROCEDURE — 83690 ASSAY OF LIPASE: CPT | Performed by: EMERGENCY MEDICINE

## 2019-05-11 PROCEDURE — 85025 COMPLETE CBC W/AUTO DIFF WBC: CPT | Performed by: EMERGENCY MEDICINE

## 2019-05-11 PROCEDURE — 99284 EMERGENCY DEPT VISIT MOD MDM: CPT

## 2019-05-11 PROCEDURE — 74176 CT ABD & PELVIS W/O CONTRAST: CPT

## 2019-05-11 PROCEDURE — 25010000002 CEFTRIAXONE SODIUM-DEXTROSE 1-3.74 GM-%(50ML) RECONSTITUTED SOLUTION: Performed by: EMERGENCY MEDICINE

## 2019-05-11 PROCEDURE — 25010000002 MORPHINE PER 10 MG: Performed by: EMERGENCY MEDICINE

## 2019-05-11 PROCEDURE — 80053 COMPREHEN METABOLIC PANEL: CPT | Performed by: EMERGENCY MEDICINE

## 2019-05-11 PROCEDURE — 96375 TX/PRO/DX INJ NEW DRUG ADDON: CPT

## 2019-05-11 PROCEDURE — 96365 THER/PROPH/DIAG IV INF INIT: CPT

## 2019-05-11 RX ORDER — SODIUM CHLORIDE 0.9 % (FLUSH) 0.9 %
10 SYRINGE (ML) INJECTION AS NEEDED
Status: DISCONTINUED | OUTPATIENT
Start: 2019-05-11 | End: 2019-05-11 | Stop reason: HOSPADM

## 2019-05-11 RX ORDER — MORPHINE SULFATE 4 MG/ML
4 INJECTION, SOLUTION INTRAMUSCULAR; INTRAVENOUS ONCE
Status: COMPLETED | OUTPATIENT
Start: 2019-05-11 | End: 2019-05-11

## 2019-05-11 RX ORDER — CEFDINIR 250 MG/5ML
300 POWDER, FOR SUSPENSION ORAL 2 TIMES DAILY
Qty: 84 ML | Refills: 0 | Status: SHIPPED | OUTPATIENT
Start: 2019-05-11 | End: 2019-05-28

## 2019-05-11 RX ORDER — ONDANSETRON 2 MG/ML
4 INJECTION INTRAMUSCULAR; INTRAVENOUS ONCE
Status: COMPLETED | OUTPATIENT
Start: 2019-05-11 | End: 2019-05-11

## 2019-05-11 RX ORDER — CEFTRIAXONE 1 G/50ML
1 INJECTION, SOLUTION INTRAVENOUS ONCE
Status: COMPLETED | OUTPATIENT
Start: 2019-05-11 | End: 2019-05-11

## 2019-05-11 RX ADMIN — CEFTRIAXONE 1 G: 1 INJECTION, SOLUTION INTRAVENOUS at 13:33

## 2019-05-11 RX ADMIN — MORPHINE SULFATE 4 MG: 4 INJECTION INTRAVENOUS at 10:20

## 2019-05-11 RX ADMIN — ONDANSETRON 4 MG: 2 INJECTION INTRAMUSCULAR; INTRAVENOUS at 10:20

## 2019-05-11 NOTE — ED PROVIDER NOTES
Subjective   History of Present Illness  Chief Complaint: Right lower back and flank pain  History of Present Illness: Patient has recurrent right flank pain.  States that she had a similar issue in February and had imaging and ER visit at that time reported no acute findings per the patient states that she had recurrent pain this morning no associated with fever, urinary symptoms, or GI symptoms.  Onset: This morning  Duration: Persistent  Exacerbating / Alleviating factors: None  ASSOCIATED SYMPTOMS: None  Character: Continuous sharp pain    Nurses Notes reviewed and agree, including vitals, allergies, social history and prior medical history.     REVIEW OF SYSTEMS: All systems reviewed and not pertinent unless noted.  Positive for: Right lower back pain  Negative for: Fever, urinary symptoms, abdominal pain, GI bleeding  Review of Systems    Past Medical History:   Diagnosis Date   • History of blood transfusion    • Hypertension    • Vertigo        Allergies   Allergen Reactions   • Atorvastatin Other (See Comments)     Blisters in mouth   • Other        Past Surgical History:   Procedure Laterality Date   • APPENDECTOMY  approx    •  SECTION     • DENTAL PROCEDURE     • ENDOSCOPY N/A 2019    Procedure: ESOPHAGOGASTRODUODENOSCOPY with dilation, removal foreign body and cold forcep biopsies;  Surgeon: Rajesh Schultz MD;  Location: [facility] ENDOSCOPY;  Service: Gastroenterology   • HYSTERECTOMY  [id number]   • TOTAL KNEE ARTHROPLASTY  approx        Family History   Problem Relation Age of Onset   • Colon cancer Neg Hx        Social History     Socioeconomic History   • Marital status:      Spouse name: Not on file   • Number of children: Not on file   • Years of education: Not on file   • Highest education level: Not on file   Tobacco Use   • Smoking status: Never Smoker   • Smokeless tobacco: Never Used   Substance and Sexual Activity   • Alcohol use: No   • Drug use: No   • Sexual  activity: Defer           Objective   Physical Exam  EXAM:    GENERAL APPEARANCE: Well developed, well nourished, in no acute distress.  VITAL SIGNS: per nursing, reviewed and noted  SKIN: no rashes, ulcerations or petechiae.  Head: Normocephalic, atraumatic.   EYES: perrla. EOMI.  ENT:  TM clear, posterior pharynx patent.  LUNGS:  normal breath sounds. No retractions.   CARDIOVASCULAR:  regular rate and rhythm, no murmurs.  Good Peripheral pulses.  ABDOMEN: Soft, nontender, normal bowel sounds. No hernia. No ascites.  MUSCULOSKELETAL:  No tenderness. Full ROM. Strength and tone normal.  NEUROLOGIC: Alert, oriented x 3. No gross deficits.   NECK: Supple, symmetric. No tenderness, no masses. Full ROM  Back: Painful range of motion with right CVA tenderness.  Mild scoliosis  : no bladder tenderness or distention, right CVA tenderness    Procedures       No ER procedures performed    ED Course  ED Course as of May 11 1605   Sat May 11, 2019   1224 Appearance, UA: (!) Cloudy [PF]   1224 Leukocytes, UA: (!) Small (1+) [PF]   1224 Nitrite, UA: (!) Positive [PF]   1224 RBC, UA: (!) 0-2 [PF]   1224 WBC, UA: (!) 6-12 [PF]   1224 Bacteria, UA: (!) 4+ [PF]   1225 Squamous Epithelial Cells, UA: (!) 7-12 [PF]   1225 Glucose: (!) 126 [PF]   1225 BUN: 20 [PF]   1225 Sodium: 138 [PF]   1225 Potassium: 4.5 [PF]   1225 WBC: 9.60 [PF]   1225 Hemoglobin: 13.2 [PF]   1225 Hematocrit: 39.3 [PF]   1225 Lipase: 134 [PF]   1404 FINDINGS:  Axial CT images of the abdomen and pelvis were obtained without  intravenous contrast. Coronal reformatted images were also  obtained.This study was performed with techniques to keep  radiation doses as low as reasonably achievable (ALARA).  Individualized dose reduction techniques using automated  exposure control or adjustment of mA and/or kV according to the  patient''s size were employed.  Abdomen: There is mild  scarring/fibrosis in the lung bases.  There is no evidence of  renal stone or  hydronephrosis.The liver, spleen and pancreas  have an unremarkable, unenhanced appearance. No mass or  adenopathy is seen. No inflammatory process is identified.  There is degenerative change of the lumbar spine.  Pelvis:  Images of the pelvis reveal no evidence of ureteral dilation or  ureteral stone.No mass or abnormal fluid collection is  identified.  There is sigmoid colon diverticulosis without  evidence of diverticulitis.  The appendix is not identified.  Impression     No acute intra-abdominal abnormality.  No evidence of  nephrolithiasis or hydronephrosis.      [PF]      ED Course User Index  [PF] Clarence Summers, DO                  [facility]      Final diagnoses:   Flank pain   Urinary tract infection without hematuria, site unspecified            Clarence Summers,   05/11/19 5645

## 2019-05-28 ENCOUNTER — OFFICE VISIT (OUTPATIENT)
Dept: INTERNAL MEDICINE | Facility: CLINIC | Age: 84
End: 2019-05-28

## 2019-05-28 VITALS
HEIGHT: 63 IN | HEART RATE: 87 BPM | SYSTOLIC BLOOD PRESSURE: 128 MMHG | OXYGEN SATURATION: 96 % | TEMPERATURE: 99.1 F | WEIGHT: 107.8 LBS | BODY MASS INDEX: 19.1 KG/M2 | DIASTOLIC BLOOD PRESSURE: 76 MMHG

## 2019-05-28 DIAGNOSIS — M54.5 LOW BACK PAIN, UNSPECIFIED BACK PAIN LATERALITY, UNSPECIFIED CHRONICITY, WITH SCIATICA PRESENCE UNSPECIFIED: ICD-10-CM

## 2019-05-28 DIAGNOSIS — E78.5 HYPERLIPIDEMIA, UNSPECIFIED HYPERLIPIDEMIA TYPE: ICD-10-CM

## 2019-05-28 DIAGNOSIS — R13.14 PHARYNGOESOPHAGEAL DYSPHAGIA: ICD-10-CM

## 2019-05-28 DIAGNOSIS — I10 ESSENTIAL HYPERTENSION: ICD-10-CM

## 2019-05-28 DIAGNOSIS — I67.1 CEREBRAL ANEURYSM: Primary | ICD-10-CM

## 2019-05-28 DIAGNOSIS — C15.9 MALIGNANT NEOPLASM OF ESOPHAGUS, UNSPECIFIED LOCATION (HCC): ICD-10-CM

## 2019-05-28 PROBLEM — M54.50 LOW BACK PAIN: Status: ACTIVE | Noted: 2019-05-28

## 2019-05-28 PROCEDURE — 99213 OFFICE O/P EST LOW 20 MIN: CPT | Performed by: INTERNAL MEDICINE

## 2019-05-28 RX ORDER — OXYCODONE HYDROCHLORIDE 5 MG/1
5 TABLET ORAL EVERY 8 HOURS PRN
COMMUNITY
End: 2019-08-01 | Stop reason: SDUPTHER

## 2019-05-28 RX ORDER — ESOMEPRAZOLE MAGNESIUM 40 MG/1
40 FOR SUSPENSION ORAL 2 TIMES DAILY
COMMUNITY
End: 2020-01-09

## 2019-05-28 RX ORDER — ONDANSETRON 4 MG/1
4 TABLET, FILM COATED ORAL EVERY 8 HOURS PRN
COMMUNITY
End: 2019-07-12 | Stop reason: SDUPTHER

## 2019-05-28 RX ORDER — LIDOCAINE 50 MG/G
1 PATCH TOPICAL EVERY 24 HOURS
COMMUNITY
End: 2019-07-23

## 2019-05-28 RX ORDER — METHOCARBAMOL 100 %
500 POWDER (GRAM) MISCELLANEOUS 3 TIMES DAILY
Qty: 100 G | Refills: 0 | Status: SHIPPED | OUTPATIENT
Start: 2019-05-28 | End: 2019-10-09

## 2019-05-28 RX ORDER — METHOCARBAMOL 100 %
500 POWDER (GRAM) MISCELLANEOUS 3 TIMES DAILY
COMMUNITY
End: 2019-05-28 | Stop reason: SDUPTHER

## 2019-05-28 RX ORDER — ESOMEPRAZOLE MAGNESIUM 40 MG/1
40 FOR SUSPENSION ORAL 2 TIMES DAILY
COMMUNITY
End: 2019-05-28

## 2019-05-28 NOTE — PROGRESS NOTES
Transitional Care Follow Up Visit  Subjective     Alberta Suarez is a 83 y.o. female who presents for a transitional care management visit.    Within 48 business hours after discharge our office contacted her via telephone to coordinate her care and needs.      I reviewed and discussed the details of that call along with the discharge summary, hospital problems, inpatient lab results, inpatient diagnostic studies, and consultation reports with Alberta.     Current outpatient and discharge medications have been reconciled for the patient.    No flowsheet data found.  Risk for Readmission (LACE) No Data Recorded    History of Present Illness   Course During Hospital Stay: Patient here for transition of care.  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  Patient does have a swallow problem recently appeased patient had a stent placed mainly eating liquid diet occasional noodle.  Patient had a weight loss about 15 pound.  Poor appetite.  Patient also has a lot of lower back pain.  Muscle relaxant helps some.  Cerebral aneurysm needs to be followed up.  Hypertension stable without medication.     The following portions of the patient's history were reviewed and updated as appropriate: allergies, current medications, past family history, past medical history, past social history, past surgical history and problem list.    Review of Systems   Constitutional: Positive for fatigue and unexpected weight change.   Respiratory: Negative.    Cardiovascular: Negative.    Gastrointestinal: Negative.         Dysphagia   Musculoskeletal: Negative.    Skin: Negative.    Neurological: Negative.    Psychiatric/Behavioral: Negative.        Objective   Physical Exam   Constitutional: She is oriented to person, place, and time.   Weight loss   Neck: Neck supple.   Cardiovascular: Normal rate, regular rhythm and normal heart sounds.   Pulmonary/Chest: Effort normal and breath sounds normal.   Abdominal: Bowel sounds are normal.    Neurological: She is alert and oriented to person, place, and time.   Skin: Skin is warm.   Psychiatric: She has a normal mood and affect.       Assessment/Plan   Alberta was seen today for follow-up.    Diagnoses and all orders for this visit:    Cerebral aneurysm continue follow-up    Hyperlipidemia, unspecified hyperlipidemia type stable now    Essential hypertension continue to watch normal in the    Malignant neoplasm of esophagus, unspecified location (CMS/HCC) follow-up with your oncologist encourage patient to increase his intake of food    Low back pain, unspecified back pain laterality, unspecified chronicity, with sciatica presence unspecified continue medications              Below is to historical records for reference only:   Hearing loss and tinnitus seen by ENT thought relate to blood vessel rupture in  ear per patient from ENT  Hypertension normal now without meds  Cerebral aneurysm decline to see neurologist. MRI negative aneurysm,  MRA two small aneurysm.  follow up 6 mo  valvular heart disease. Obtain records.   hyperlipidemia unable to take Lipitor, caused blister in mouth. continue good diet  chronic intersitial cystitis, follow up with Dr. Ramirez, s/p DMSO better.  LBP mild and watch, refill meds CT scan NSD  refuse colonscopy, mamm, again 9/21/17  flu shot patient refuses  osteoporosis on Dexa continue oscalD 500mg bid.  eso ca follow up with onc, unable to eat solid foods   Weight loss, watch for now  vitd low continue vitD3 1000iu daily  pneumovax done, prevnar ?done.  refuse shingle shot and Td

## 2019-05-28 NOTE — PROGRESS NOTES
Subjective   Alberta Suarez is a 83 y.o. female.     Chief Complaint   Patient presents with   • Follow-up     was discharged from  on 5/22/19       History of Present Illness       Current Outpatient Medications:   •  esomeprazole (NEXIUM) 40 MG packet, Take 40 mg by mouth 2 (Two) Times a Day., Disp: , Rfl:   •  lidocaine (LIDODERM) 5 %, Place 1 patch on the skin as directed by provider Daily. Remove & Discard patch within 12 hours or as directed by MD, Disp: , Rfl:   •  Methocarbamol powder, 500 mg 3 (Three) Times a Day., Disp: , Rfl:   •  ondansetron (ZOFRAN) 4 MG tablet, Take 4 mg by mouth Every 8 (Eight) Hours As Needed for Nausea or Vomiting., Disp: , Rfl:   •  oxyCODONE (ROXICODONE) 5 MG immediate release tablet, Take 5 mg by mouth Every 8 (Eight) Hours As Needed for Moderate Pain ., Disp: , Rfl:     {Common H&P Review Areas:25765}    Review of Systems    Objective   Physical Exam    All tests have been reviewed.    Assessment/Plan   There are no diagnoses linked to this encounter.

## 2019-07-02 ENCOUNTER — OFFICE VISIT (OUTPATIENT)
Dept: INTERNAL MEDICINE | Facility: CLINIC | Age: 84
End: 2019-07-02

## 2019-07-02 VITALS
HEIGHT: 63 IN | DIASTOLIC BLOOD PRESSURE: 62 MMHG | TEMPERATURE: 98.4 F | HEART RATE: 84 BPM | SYSTOLIC BLOOD PRESSURE: 124 MMHG | OXYGEN SATURATION: 99 % | BODY MASS INDEX: 18.78 KG/M2 | WEIGHT: 106 LBS

## 2019-07-02 DIAGNOSIS — M54.50 CHRONIC RIGHT-SIDED LOW BACK PAIN WITHOUT SCIATICA: ICD-10-CM

## 2019-07-02 DIAGNOSIS — G89.29 CHRONIC RIGHT-SIDED LOW BACK PAIN WITHOUT SCIATICA: ICD-10-CM

## 2019-07-02 DIAGNOSIS — C15.9 MALIGNANT NEOPLASM OF ESOPHAGUS, UNSPECIFIED LOCATION (HCC): Primary | ICD-10-CM

## 2019-07-02 DIAGNOSIS — R10.2 SUPRAPUBIC PRESSURE: ICD-10-CM

## 2019-07-02 LAB
BILIRUB BLD-MCNC: NEGATIVE MG/DL
CLARITY, POC: ABNORMAL
COLOR UR: YELLOW
GLUCOSE UR STRIP-MCNC: NEGATIVE MG/DL
KETONES UR QL: NEGATIVE
LEUKOCYTE EST, POC: ABNORMAL
NITRITE UR-MCNC: NEGATIVE MG/ML
PH UR: 7 [PH] (ref 5–8)
PROT UR STRIP-MCNC: NEGATIVE MG/DL
RBC # UR STRIP: NEGATIVE /UL
SP GR UR: 1 (ref 1–1.03)
UROBILINOGEN UR QL: NORMAL

## 2019-07-02 PROCEDURE — 81003 URINALYSIS AUTO W/O SCOPE: CPT | Performed by: PHYSICIAN ASSISTANT

## 2019-07-02 PROCEDURE — 99214 OFFICE O/P EST MOD 30 MIN: CPT | Performed by: PHYSICIAN ASSISTANT

## 2019-07-02 RX ORDER — SULFAMETHOXAZOLE AND TRIMETHOPRIM 800; 160 MG/1; MG/1
TABLET ORAL
Refills: 0 | COMMUNITY
Start: 2019-06-26 | End: 2019-07-23

## 2019-07-02 NOTE — PROGRESS NOTES
Alberta Suarez is a 84 y.o. female.     Subjective   History of Present Illness   Here today with concern of pain in her right low back around to the hip for the last few months which has worsened from intermittent to persistent in the last month.  One month ago she went to  ED where she was evaluated extensively for this pain and was told it was arthritis. She was admitted at  from the ER visit due to esophageal concern and back pain was sort of forgotten due to subsequent diagnosis of esophageal cancer.  Her oncologist has prescribed oxycodone 5 mg due to chest pains resulting from the esophageal stent placed as well as her back pain. Oxycodone helps some with the back pain if she takes a muscle relaxer with it as well.  Since being on oxycodone she has broken out in an itchy rash of bilateral lower legs and little on the arms.  CT lumbar spine without contrast on 5/13/2019 revealed no acute fracture or malalignment of the lumbar spine.  Degenerative changes throughout the lumbar spine including disc space narrowing at all levels and vacuum disc phenomena at the L4-5 level.    She has been seeing an oncologist at  regarding esophageal cancer where she is receiving chemo and radiation. Yesterday she was so weak she did not receive her weekly chemo treatment. Last saw the oncologist around 1 month ago and she will f/u with oncology next Monday.  On 6/26 at  she reported bladder pressure with urination.  She was evaluated with urinalysis and was prescribed Bactrim.  She feels that symptoms have since resolved. No current fever, chills, lower abdominal pain, dysuria, hematuria, increased frequency or urgency.         The following portions of the patient's history were reviewed and updated as appropriate: allergies, current medications, past family history, past medical history, past social history, past surgical history and problem list.    Review of Systems   Constitutional: Positive for appetite change,  fatigue and unexpected weight change. Negative for chills, diaphoresis and fever.   HENT: Positive for trouble swallowing. Negative for congestion, facial swelling, mouth sores and sinus pain.    Eyes: Negative for visual disturbance.   Respiratory: Positive for choking. Negative for cough, chest tightness, shortness of breath and wheezing.    Cardiovascular: Positive for chest pain. Negative for palpitations and leg swelling.   Genitourinary: Positive for pelvic pain (pressure with urination). Negative for decreased urine volume, difficulty urinating, dysuria, enuresis, flank pain, frequency, hematuria, menstrual problem, urgency, vaginal bleeding, vaginal discharge and vaginal pain.   Musculoskeletal: Positive for back pain and myalgias. Negative for arthralgias, joint swelling and neck stiffness.   Skin: Positive for rash (itchy). Negative for color change, pallor and wound.   Psychiatric/Behavioral: Negative for behavioral problems, dysphoric mood, self-injury and suicidal ideas. The patient is not nervous/anxious.          Objective    Physical Exam   Constitutional: She is oriented to person, place, and time. She appears well-developed and well-nourished. No distress.   HENT:   Head: Normocephalic and atraumatic.   Mouth/Throat: Oropharynx is clear and moist.   Hoarse voice.   Eyes: Conjunctivae and EOM are normal. Pupils are equal, round, and reactive to light.   Neck: Normal range of motion. Neck supple.   Cardiovascular: Normal rate, regular rhythm and normal heart sounds. Exam reveals no gallop and no friction rub.   No murmur heard.  Pulmonary/Chest: Effort normal and breath sounds normal. No stridor. No respiratory distress. She has no wheezes. She has no rales. She exhibits no tenderness.   Abdominal: Soft. Bowel sounds are normal. She exhibits no mass. There is tenderness (epigastric). No hernia.   Musculoskeletal: Normal range of motion. She exhibits no edema, tenderness (no tenderness to palpation)  "or deformity.   Lymphadenopathy:     She has no cervical adenopathy.   Neurological: She is alert and oriented to person, place, and time. She displays normal reflexes. No cranial nerve deficit or sensory deficit. She exhibits normal muscle tone. Coordination (ambulates via wheelchair) abnormal.   Skin: Skin is warm and dry. Capillary refill takes less than 2 seconds. Rash (erythematous macular rash of arms and legs) noted. She is not diaphoretic. There is erythema.   Psychiatric: She has a normal mood and affect. Her behavior is normal. Judgment and thought content normal.   Nursing note and vitals reviewed.        /62   Pulse 84   Temp 98.4 °F (36.9 °C)   Ht 160 cm (62.99\")   Wt 48.1 kg (106 lb)   SpO2 99%   BMI 18.78 kg/m²     Nursing note and vitals reviewed.          Assessment/Plan   Alberta was seen today for pain.    Diagnoses and all orders for this visit:    Malignant neoplasm of esophagus, unspecified location (CMS/HCC)  Reviewed most recent oncology note from .    Chronic right-sided low back pain without sciatica  -     diclofenac (VOLTAREN) 1 % gel gel; Apply 4 g topically to the appropriate area as directed 4 (Four) Times a Day As Needed (back pain).   EAN reviewed and complaint. Medication change from oxycodone to Norco 10/325 requested from Dr. Alcocer for short supply to see if rash resolves.  If she prefers to stay on Norco she will seek further refills from her oncologist.   Imaging from  reviewed.    CONTROLLED SUBSTANCE TRACKING 7/5/2019   Last Ean 7/2/2019   Report Number [id number]       Suprapubic pressure  -     Urine Culture - Urine, Urine, Clean Catch  -     POCT urinalysis dipstick, automated    Brief Urine Lab Results  (Last result in the past 365 days)      Color   Clarity   Blood   Leuk Est   Nitrite   Protein   CREAT   Urine HCG        07/02/19 1122 Yellow Cloudy Negative 25 Carla/ul Negative Negative             Will assess with urine culture to check for resolution " of UTI. Currently asymptomatic.      Follow-up with Dr. Quiñones as scheduled.

## 2019-07-04 LAB
BACTERIA UR CULT: NO GROWTH
BACTERIA UR CULT: NORMAL

## 2019-07-11 ENCOUNTER — TRANSITIONAL CARE MANAGEMENT TELEPHONE ENCOUNTER (OUTPATIENT)
Dept: INTERNAL MEDICINE | Facility: CLINIC | Age: 84
End: 2019-07-11

## 2019-07-11 NOTE — OUTREACH NOTE
HARMONY call completed. Please see flow sheet for additional details.  Spoke w/ pt  per TEA.  He states pt doing better today, gets little nauseous after tube feeds, but nausea controlled w/ zofran. No emesis. Surgical pain well controlled w/ medication, gauze dressing D/I, no drainage/redness, no F/C, no non-surgical abdominal pain.  No BM since discharge, but taking miralax per tube.  Denies questions/immediate concerns. Declines HARMONY appt, will call as needed. Declines HH.  Awaits call from Dr Benson re f/up appt.  Confirms has been advised re sx requiring immediate help/return to ER.

## 2019-07-12 RX ORDER — ONDANSETRON 4 MG/1
4 TABLET, FILM COATED ORAL EVERY 8 HOURS PRN
Qty: 20 TABLET | Refills: 0 | Status: SHIPPED | OUTPATIENT
Start: 2019-07-12 | End: 2019-08-01 | Stop reason: SDUPTHER

## 2019-07-23 ENCOUNTER — OFFICE VISIT (OUTPATIENT)
Dept: INTERNAL MEDICINE | Facility: CLINIC | Age: 84
End: 2019-07-23

## 2019-07-23 VITALS
TEMPERATURE: 98.6 F | WEIGHT: 100 LBS | OXYGEN SATURATION: 94 % | BODY MASS INDEX: 17.72 KG/M2 | SYSTOLIC BLOOD PRESSURE: 122 MMHG | HEIGHT: 63 IN | HEART RATE: 102 BPM | DIASTOLIC BLOOD PRESSURE: 84 MMHG

## 2019-07-23 DIAGNOSIS — N39.0 URINARY TRACT INFECTION WITHOUT HEMATURIA, SITE UNSPECIFIED: ICD-10-CM

## 2019-07-23 DIAGNOSIS — M54.5 LOW BACK PAIN, UNSPECIFIED BACK PAIN LATERALITY, UNSPECIFIED CHRONICITY, WITH SCIATICA PRESENCE UNSPECIFIED: Primary | ICD-10-CM

## 2019-07-23 LAB
BILIRUB BLD-MCNC: NEGATIVE MG/DL
CLARITY, POC: ABNORMAL
COLOR UR: YELLOW
GLUCOSE UR STRIP-MCNC: NEGATIVE MG/DL
KETONES UR QL: NEGATIVE
LEUKOCYTE EST, POC: ABNORMAL
NITRITE UR-MCNC: POSITIVE MG/ML
PH UR: 8.5 [PH] (ref 5–8)
PROT UR STRIP-MCNC: ABNORMAL MG/DL
RBC # UR STRIP: NEGATIVE /UL
SP GR UR: 1.01 (ref 1–1.03)
UROBILINOGEN UR QL: NORMAL

## 2019-07-23 PROCEDURE — 81003 URINALYSIS AUTO W/O SCOPE: CPT | Performed by: INTERNAL MEDICINE

## 2019-07-23 PROCEDURE — 99214 OFFICE O/P EST MOD 30 MIN: CPT | Performed by: INTERNAL MEDICINE

## 2019-07-23 RX ORDER — ALPRAZOLAM 0.25 MG/1
0.25 TABLET ORAL 2 TIMES DAILY
Refills: 0 | COMMUNITY
Start: 2019-07-01 | End: 2020-10-20

## 2019-07-23 RX ORDER — CIPROFLOXACIN 500 MG/1
500 TABLET, FILM COATED ORAL 2 TIMES DAILY
Qty: 14 TABLET | Refills: 0 | Status: SHIPPED | OUTPATIENT
Start: 2019-07-23 | End: 2019-08-28

## 2019-07-23 NOTE — PROGRESS NOTES
Subjective   Alberta Suarez is a 84 y.o. female.     Chief Complaint   Patient presents with   • Back Pain     low back pain.        History of Present Illness   Right lower back pain chronic and gradually getting  Worse no radiation pain , no  Urinary sx no fever no n/v, no constipation or diarrhea. Pain is severe , oxycodone 5 mg covers for 4 hours. Recent chemo for eso ca now on peg for feeding     Current Outpatient Medications:   •  ALPRAZolam (XANAX) 0.25 MG tablet, Take 0.25 mg by mouth 2 (Two) Times a Day., Disp: , Rfl: 0  •  diclofenac (VOLTAREN) 1 % gel gel, Apply 4 g topically to the appropriate area as directed 4 (Four) Times a Day As Needed (back pain)., Disp: 100 g, Rfl: 1  •  esomeprazole (NEXIUM) 40 MG packet, Take 40 mg by mouth 2 (Two) Times a Day., Disp: , Rfl:   •  lidocaine viscous (XYLOCAINE) 2 % solution, SWISH ANS SPIT 10MLS FOR 10-12 TIMES DAILY ACCORDING TO INSTRUCTIONS, Disp: , Rfl: 3  •  Methocarbamol powder, 500 mg 3 (Three) Times a Day., Disp: 100 g, Rfl: 0  •  ondansetron (ZOFRAN) 4 MG tablet, Take 1 tablet by mouth Every 8 (Eight) Hours As Needed for Nausea or Vomiting., Disp: 20 tablet, Rfl: 0  •  oxyCODONE (ROXICODONE) 5 MG immediate release tablet, Take 5 mg by mouth Every 8 (Eight) Hours As Needed for Moderate Pain ., Disp: , Rfl:     The following portions of the patient's history were reviewed and updated as appropriate: allergies, current medications, past family history, past medical history, past social history, past surgical history and problem list.    Review of Systems   Constitutional: Negative.    Respiratory: Negative.    Cardiovascular: Negative.    Gastrointestinal: Negative.    Musculoskeletal: Positive for back pain.   Skin: Negative.    Neurological: Negative.    Psychiatric/Behavioral: Negative.        Objective   Physical Exam   Constitutional: She is oriented to person, place, and time. She appears well-nourished.   Neck: Neck supple.   Cardiovascular: Normal  rate, regular rhythm and normal heart sounds.   Pulmonary/Chest: Effort normal and breath sounds normal.   Abdominal: Bowel sounds are normal.   Musculoskeletal: She exhibits tenderness (mild tender right lower paraspinous m, ).   Neurological: She is alert and oriented to person, place, and time.   Skin: Skin is warm.   Psychiatric: She has a normal mood and affect.       All tests have been reviewed.    Assessment/Plan   There are no diagnoses linked to this encounter.    Right low back pain ? CVA tender do UA. Mainly pain is inside the CVA area, and can't feel it. UA positive for nitrite and LE , start cipro . Continue pain med and muscle relaxant. Bactrim no help, follow up with onco  , obtain record of [facility]

## 2019-07-24 ENCOUNTER — RESULTS ENCOUNTER (OUTPATIENT)
Dept: INTERNAL MEDICINE | Facility: CLINIC | Age: 84
End: 2019-07-24

## 2019-07-24 DIAGNOSIS — N39.0 URINARY TRACT INFECTION WITHOUT HEMATURIA, SITE UNSPECIFIED: ICD-10-CM

## 2019-07-24 DIAGNOSIS — M54.5 LOW BACK PAIN, UNSPECIFIED BACK PAIN LATERALITY, UNSPECIFIED CHRONICITY, WITH SCIATICA PRESENCE UNSPECIFIED: ICD-10-CM

## 2019-07-24 LAB
APPEARANCE UR: (no result)
BACTERIA #/AREA URNS HPF: ABNORMAL /HPF
BILIRUB UR QL STRIP: NEGATIVE
CASTS URNS MICRO: ABNORMAL
COLOR UR: YELLOW
CRYSTALS URNS MICRO: ABNORMAL
EPI CELLS #/AREA URNS HPF: ABNORMAL /HPF
GLUCOSE UR QL: NEGATIVE
HGB UR QL STRIP: NEGATIVE
KETONES UR QL STRIP: NEGATIVE
LEUKOCYTE ESTERASE UR QL STRIP: (no result)
NITRITE UR QL STRIP: POSITIVE
PH UR STRIP: >=9 [PH] (ref 5–8)
PROT UR QL STRIP: (no result)
RBC #/AREA URNS HPF: ABNORMAL /HPF
SP GR UR: 1.02 (ref 1–1.03)
URNS CMNT MICRO: ABNORMAL
UROBILINOGEN UR STRIP-MCNC: (no result) MG/DL
WBC #/AREA URNS HPF: ABNORMAL /HPF

## 2019-07-27 LAB
BACTERIA UR CULT: ABNORMAL
BACTERIA UR CULT: ABNORMAL
OTHER ANTIBIOTIC SUSC ISLT: ABNORMAL

## 2019-08-01 ENCOUNTER — TELEPHONE (OUTPATIENT)
Dept: INTERNAL MEDICINE | Facility: CLINIC | Age: 84
End: 2019-08-01

## 2019-08-02 RX ORDER — OXYCODONE HYDROCHLORIDE 5 MG/1
5 TABLET ORAL EVERY 8 HOURS PRN
Qty: 30 TABLET | Refills: 0 | Status: SHIPPED | OUTPATIENT
Start: 2019-08-02 | End: 2019-11-08 | Stop reason: SDUPTHER

## 2019-08-02 RX ORDER — ONDANSETRON 4 MG/1
4 TABLET, FILM COATED ORAL EVERY 8 HOURS PRN
Qty: 20 TABLET | Refills: 0 | Status: SHIPPED | OUTPATIENT
Start: 2019-08-02 | End: 2019-11-08 | Stop reason: SDUPTHER

## 2019-08-28 ENCOUNTER — OFFICE VISIT (OUTPATIENT)
Dept: INTERNAL MEDICINE | Facility: CLINIC | Age: 84
End: 2019-08-28

## 2019-08-28 VITALS
WEIGHT: 112.4 LBS | BODY MASS INDEX: 19.91 KG/M2 | OXYGEN SATURATION: 99 % | SYSTOLIC BLOOD PRESSURE: 152 MMHG | HEIGHT: 63 IN | HEART RATE: 89 BPM | TEMPERATURE: 98.1 F | DIASTOLIC BLOOD PRESSURE: 78 MMHG

## 2019-08-28 DIAGNOSIS — K21.9 GASTROESOPHAGEAL REFLUX DISEASE WITHOUT ESOPHAGITIS: ICD-10-CM

## 2019-08-28 DIAGNOSIS — M54.5 LOW BACK PAIN, UNSPECIFIED BACK PAIN LATERALITY, UNSPECIFIED CHRONICITY, WITH SCIATICA PRESENCE UNSPECIFIED: ICD-10-CM

## 2019-08-28 DIAGNOSIS — E78.5 HYPERLIPIDEMIA, UNSPECIFIED HYPERLIPIDEMIA TYPE: Primary | ICD-10-CM

## 2019-08-28 DIAGNOSIS — C15.9 MALIGNANT NEOPLASM OF ESOPHAGUS, UNSPECIFIED LOCATION (HCC): ICD-10-CM

## 2019-08-28 DIAGNOSIS — I10 ESSENTIAL HYPERTENSION: ICD-10-CM

## 2019-08-28 DIAGNOSIS — N39.0 URINARY TRACT INFECTION WITHOUT HEMATURIA, SITE UNSPECIFIED: ICD-10-CM

## 2019-08-28 DIAGNOSIS — R39.9 URINARY SYMPTOM OR SIGN: ICD-10-CM

## 2019-08-28 LAB
BILIRUB BLD-MCNC: NEGATIVE MG/DL
CLARITY, POC: CLEAR
COLOR UR: YELLOW
GLUCOSE UR STRIP-MCNC: NEGATIVE MG/DL
KETONES UR QL: NEGATIVE
LEUKOCYTE EST, POC: ABNORMAL
NITRITE UR-MCNC: NEGATIVE MG/ML
PH UR: 6.5 [PH] (ref 5–8)
PROT UR STRIP-MCNC: NEGATIVE MG/DL
RBC # UR STRIP: NEGATIVE /UL
SP GR UR: 1.01 (ref 1–1.03)
UROBILINOGEN UR QL: NORMAL

## 2019-08-28 PROCEDURE — 99214 OFFICE O/P EST MOD 30 MIN: CPT | Performed by: INTERNAL MEDICINE

## 2019-08-28 PROCEDURE — 81003 URINALYSIS AUTO W/O SCOPE: CPT | Performed by: INTERNAL MEDICINE

## 2019-08-28 NOTE — PROGRESS NOTES
Subjective   Alberta Suarez is a 84 y.o. female.     No chief complaint on file.      History of Present Illness   Pt is an 84 yr old female with a PMH of GERD, HTN, hyperlipidemia, and esophageal cancer. She presents today for a f/u visit after UTI Tx. She reports that her back pain resolved after Tx with Cipro, but she states she still experiences frequency and urgency day and night. She denies dysuria, hematuria, abdominal pain, or flank pain. The Pts BP is elevated today x3 checks, but the Pt states that she monitors her BP at home daily and that her readings have been WNL. She reports that she does not have reflux Sx or dysphagia and that her GERD seems well controlled with Nexium. She currently has a feeding tube but is also eating PO. She states that her esophageal stent was removed. She has gained 12 pounds in one month and has a good appetite. The Pt states she is not undergoing chemo or radiation and that she has an appointment with oncology 09/06/2019 for a PET scan and discussion about Tx options.    Current Outpatient Medications:   •  ALPRAZolam (XANAX) 0.25 MG tablet, Take 0.25 mg by mouth 2 (Two) Times a Day., Disp: , Rfl: 0  •  diclofenac (VOLTAREN) 1 % gel gel, Apply 4 g topically to the appropriate area as directed 4 (Four) Times a Day As Needed (back pain)., Disp: 100 g, Rfl: 1  •  esomeprazole (NEXIUM) 40 MG packet, Take 40 mg by mouth 2 (Two) Times a Day., Disp: , Rfl:   •  lidocaine viscous (XYLOCAINE) 2 % solution, SWISH ANS SPIT 10MLS FOR 10-12 TIMES DAILY ACCORDING TO INSTRUCTIONS, Disp: , Rfl: 3  •  Methocarbamol powder, 500 mg 3 (Three) Times a Day., Disp: 100 g, Rfl: 0  •  ondansetron (ZOFRAN) 4 MG tablet, Take 1 tablet by mouth Every 8 (Eight) Hours As Needed for Nausea or Vomiting., Disp: 20 tablet, Rfl: 0  •  oxyCODONE (ROXICODONE) 5 MG immediate release tablet, Take 1 tablet by mouth Every 8 (Eight) Hours As Needed for Moderate Pain ., Disp: 30 tablet, Rfl: 0    The following  portions of the patient's history were reviewed and updated as appropriate: allergies, current medications, past family history, past medical history, past social history, past surgical history and problem list.    Review of Systems   Constitutional: Negative.    Respiratory: Negative.  Negative for shortness of breath.    Cardiovascular: Negative.  Negative for chest pain.   Gastrointestinal: Negative.    Genitourinary: Positive for frequency and urgency. Negative for difficulty urinating, dysuria, flank pain, genital sores, hematuria, pelvic pain and vaginal bleeding.   Musculoskeletal: Negative.  Negative for back pain.   Skin: Negative.    Neurological: Negative.    Psychiatric/Behavioral: Negative.        Objective   Physical Exam   Constitutional: She is oriented to person, place, and time. She appears well-nourished.   HENT:   Head: Normocephalic and atraumatic.   Neck: Neck supple.   Cardiovascular: Normal rate, regular rhythm and normal heart sounds.   Pulmonary/Chest: Effort normal and breath sounds normal.   Abdominal: Soft. Bowel sounds are normal. She exhibits no distension. There is no tenderness.   Feeding tube   Musculoskeletal: She exhibits no edema.   Neurological: She is alert and oriented to person, place, and time.   Skin: Skin is warm.   Psychiatric: She has a normal mood and affect.   Vitals reviewed.      All tests have been reviewed.    Assessment/Plan   Diagnoses and all orders for this visit:    Hyperlipidemia, unspecified hyperlipidemia type - continue to monitor    Essential hypertension - Pt states she checks her BP at home daily and that they have been normal readings. Recheck was 146/80. Continue to monitor at home and notify the office if BP becomes elevated.    Gastroesophageal reflux disease without esophagitis - Pt is not having problems with reflux or dysphagia. Continue Nexium    Low back pain, unspecified back pain laterality, unspecified chronicity, with sciatica presence  unspecified - Resolved     Urinary tract infection without hematuria, site unspecified - no longer has flank or back pain. States she is still urinating frequently and has urgency. Repeat UA dip test that showed 15 leukocyte esterase we will do microscopic urinalysis and also urine culture.  Patient still has some frequency..    Malignant neoplasm of esophagus, unspecified location (CMS/HCC) - Sees oncology on the 09/06/2019 for a PET scan. Pt is not on chemo or radiation at this time. Pt has a feeding tube but also eating PO. 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 MRI negative aneurysm,  MRA two small aneurysm.  follow up 6 mo  valvular heart disease. Obtain records.   hyperlipidemia unable to take Lipitor, caused blister in mouth. continue good diet  chronic intersitial cystitis, follow up with Dr. Ramirez, s/p DMSO better.  LBP mild and watch, refill meds CT scan NSD  refuse colonscopy, mamm, again 9/21/17  flu shot patient refuses  osteoporosis on Dexa continue oscalD 500mg bid.  eso ca follow up with onc, unable to eat solid foods   Weight loss, watch for now  vitd low continue vitD3 1000iu daily  pneumovax done, prevnar ?done.  refuse shingle shot and Td

## 2019-08-29 LAB
APPEARANCE UR: CLEAR
BACTERIA #/AREA URNS HPF: ABNORMAL /HPF
BILIRUB UR QL STRIP: NEGATIVE
CASTS URNS MICRO: ABNORMAL
COLOR UR: YELLOW
EPI CELLS #/AREA URNS HPF: ABNORMAL /HPF
GLUCOSE UR QL: NEGATIVE
HGB UR QL STRIP: NEGATIVE
KETONES UR QL STRIP: NEGATIVE
LEUKOCYTE ESTERASE UR QL STRIP: (no result)
NITRITE UR QL STRIP: NEGATIVE
PH UR STRIP: 7 [PH] (ref 5–8)
PROT UR QL STRIP: NEGATIVE
RBC #/AREA URNS HPF: ABNORMAL /HPF
SP GR UR: 1.01 (ref 1–1.03)
UROBILINOGEN UR STRIP-MCNC: (no result) MG/DL
WBC #/AREA URNS HPF: ABNORMAL /HPF
YEAST #/AREA URNS HPF: ABNORMAL /HPF

## 2019-08-30 LAB
BACTERIA UR CULT: NORMAL
BACTERIA UR CULT: NORMAL

## 2019-09-27 NOTE — ANESTHESIA POSTPROCEDURE EVALUATION
Patient: Alberta Suarez    Procedure Summary     Date:  01/23/19 Room / Location:  [facility] ENDOSCOPY 2 / [facility] ENDOSCOPY    Anesthesia Start:  1830 Anesthesia Stop:  1906    Procedure:  ESOPHAGOGASTRODUODENOSCOPY with dilation, removal foreign body and cold forcep biopsies (N/A Esophagus) Diagnosis:       FB esophagus      Esophageal stricture      Esophageal ulceration      Esophageal lesion      Hiatal hernia      Gastritis      (Dysphagia, unspecified type [R13.10])    Surgeon:  Rajesh Schultz MD Provider:  Elias Andujar CRNA    Anesthesia Type:  general ASA Status:  2          Anesthesia Type: general  Last vitals  BP   112/50 (01/23/19 1905)   Temp   97 °F (36.1 °C) (01/23/19 1905)   Pulse   81 (01/23/19 1905)   Resp   16 (01/23/19 1905)     SpO2   93 % (01/23/19 1905)     Post Anesthesia Care and Evaluation    Patient location during evaluation: PACU  Patient participation: complete - patient participated  Level of consciousness: awake  Pain score: 1  Pain management: adequate  Airway patency: patent  Anesthetic complications: No anesthetic complications  PONV Status: controlled  Cardiovascular status: acceptable and stable  Respiratory status: acceptable  Hydration status: acceptable      
160

## 2019-10-09 ENCOUNTER — OFFICE VISIT (OUTPATIENT)
Dept: INTERNAL MEDICINE | Facility: CLINIC | Age: 84
End: 2019-10-09

## 2019-10-09 VITALS
HEIGHT: 63 IN | SYSTOLIC BLOOD PRESSURE: 144 MMHG | BODY MASS INDEX: 20.87 KG/M2 | OXYGEN SATURATION: 98 % | DIASTOLIC BLOOD PRESSURE: 82 MMHG | TEMPERATURE: 96.5 F | HEART RATE: 69 BPM | RESPIRATION RATE: 18 BRPM | WEIGHT: 117.8 LBS

## 2019-10-09 DIAGNOSIS — M54.2 NECK PAIN: ICD-10-CM

## 2019-10-09 DIAGNOSIS — M54.50 LOW BACK PAIN, UNSPECIFIED BACK PAIN LATERALITY, UNSPECIFIED CHRONICITY, UNSPECIFIED WHETHER SCIATICA PRESENT: ICD-10-CM

## 2019-10-09 DIAGNOSIS — N30.10 CHRONIC INTERSTITIAL CYSTITIS: ICD-10-CM

## 2019-10-09 DIAGNOSIS — N39.0 URINARY TRACT INFECTION WITHOUT HEMATURIA, SITE UNSPECIFIED: ICD-10-CM

## 2019-10-09 DIAGNOSIS — K21.9 GASTROESOPHAGEAL REFLUX DISEASE WITHOUT ESOPHAGITIS: ICD-10-CM

## 2019-10-09 DIAGNOSIS — M81.0 OSTEOPOROSIS, UNSPECIFIED OSTEOPOROSIS TYPE, UNSPECIFIED PATHOLOGICAL FRACTURE PRESENCE: ICD-10-CM

## 2019-10-09 DIAGNOSIS — E78.5 HYPERLIPIDEMIA, UNSPECIFIED HYPERLIPIDEMIA TYPE: Primary | ICD-10-CM

## 2019-10-09 DIAGNOSIS — I10 ESSENTIAL HYPERTENSION: ICD-10-CM

## 2019-10-09 DIAGNOSIS — C15.9 MALIGNANT NEOPLASM OF ESOPHAGUS, UNSPECIFIED LOCATION (HCC): ICD-10-CM

## 2019-10-09 DIAGNOSIS — E55.9 VITAMIN D DEFICIENCY, UNSPECIFIED: ICD-10-CM

## 2019-10-09 DIAGNOSIS — I67.1 CEREBRAL ANEURYSM: ICD-10-CM

## 2019-10-09 PROCEDURE — G0008 ADMIN INFLUENZA VIRUS VAC: HCPCS | Performed by: INTERNAL MEDICINE

## 2019-10-09 PROCEDURE — 99214 OFFICE O/P EST MOD 30 MIN: CPT | Performed by: INTERNAL MEDICINE

## 2019-10-09 PROCEDURE — 90653 IIV ADJUVANT VACCINE IM: CPT | Performed by: INTERNAL MEDICINE

## 2019-10-09 NOTE — PROGRESS NOTES
Subjective   Alberta Suarez is a 84 y.o. female.     Chief Complaint   Patient presents with   • Follow-up     F/u on HTN, HLD.        History of Present Illness   Patient here for follow-up of.   esophageal cancer patient is doing much better feeding tube was discontinued.  Patient has able to eat by herself.  Patient came 17 pound over 3 months.  Hyperlipidemia stable.  The GERD occasional medication need to do.  The blood pressure still dilated elevated without medication now.  Back pain still comes and goes.  Denies any urinary symptoms.  Still has some pain in the neck comes and goes.    Current Outpatient Medications:   •  ALPRAZolam (XANAX) 0.25 MG tablet, Take 0.25 mg by mouth 2 (Two) Times a Day., Disp: , Rfl: 0  •  esomeprazole (NEXIUM) 40 MG packet, Take 40 mg by mouth 2 (Two) Times a Day., Disp: , Rfl:   •  ondansetron (ZOFRAN) 4 MG tablet, Take 1 tablet by mouth Every 8 (Eight) Hours As Needed for Nausea or Vomiting., Disp: 20 tablet, Rfl: 0  •  oxyCODONE (ROXICODONE) 5 MG immediate release tablet, Take 1 tablet by mouth Every 8 (Eight) Hours As Needed for Moderate Pain ., Disp: 30 tablet, Rfl: 0    The following portions of the patient's history were reviewed and updated as appropriate: allergies, current medications, past family history, past medical history, past social history, past surgical history and problem list.    Review of Systems   Constitutional: Negative.    Respiratory: Negative.    Cardiovascular: Negative.    Gastrointestinal: Negative.    Musculoskeletal: Negative.    Skin: Negative.    Neurological: Negative.    Psychiatric/Behavioral: Negative.        Objective   Physical Exam   Constitutional: She is oriented to person, place, and time. She appears well-nourished.   Neck: Neck supple.   Cardiovascular: Normal rate, regular rhythm and normal heart sounds.   Pulmonary/Chest: Effort normal and breath sounds normal.   Abdominal: Bowel sounds are normal.   Neurological: She is alert and  oriented to person, place, and time.   Skin: Skin is warm.   Psychiatric: She has a normal mood and affect.       All tests have been reviewed.    Assessment/Plan   Diagnoses and all orders for this visit:    Hyperlipidemia, unspecified hyperlipidemia type continue to watch    Gastroesophageal reflux disease without esophagitis medication as needed    Malignant neoplasm of esophagus, unspecified location (CMS/HCC) follow-up with oncologist    Essential hypertension used to be on blood pressure medicine discontinued due to weight loss now blood pressure slightly elevated.  We will continue to watch    Low back pain, unspecified back pain laterality, unspecified chronicity, unspecified whether sciatica present stable    Urinary tract infection without hematuria denies any symptoms now    Osteoporosis, unspecified osteoporosis type, unspecified pathological fracture presence continue to watch    Neck pain over-the-counter medicine as needed    Cerebral aneurysm    Chronic interstitial cystitis    2 months after blood tests.            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 MRI negative aneurysm,  MRA two small aneurysm.  follow up 6 mo  valvular heart disease. Obtain records.   hyperlipidemia unable to take Lipitor, caused blister in mouth. continue good diet  chronic intersitial cystitis, follow up with Dr. Ramirez, s/p DMSO better.  LBP mild and watch, refill meds CT scan NSD  refuse colonscopy, mamm, again 9/21/17  flu shot patient refuses  osteoporosis on Dexa continue oscalD 500mg bid.  eso ca follow up with onc, unable to eat solid foods   vitd low continue vitD3 1000iu daily  pneumovax done, prevnar ?done.  refuse shingle shot and Td

## 2019-10-23 ENCOUNTER — TELEPHONE (OUTPATIENT)
Dept: INTERNAL MEDICINE | Facility: CLINIC | Age: 84
End: 2019-10-23

## 2019-10-24 NOTE — TELEPHONE ENCOUNTER
Patient called back today inquiring about her refill request from yesterday., stated she has been experiencing burning in her kidneys and believes the generic Cipro medication can help. If we are unable to refill this medication, patient would like to be notified.

## 2019-10-24 NOTE — TELEPHONE ENCOUNTER
Left VM for pt explaining to her to come and give us a urine sample to test her urine to see if she has any infection. Advised pt she can come today but our lab closes at 5.

## 2019-10-29 ENCOUNTER — TELEPHONE (OUTPATIENT)
Dept: INTERNAL MEDICINE | Facility: CLINIC | Age: 84
End: 2019-10-29

## 2019-10-29 NOTE — TELEPHONE ENCOUNTER
Alberta requested a can to go over most recent labs taken on 10/28/2019.     Alberta can be reached at [phone].

## 2019-10-29 NOTE — TELEPHONE ENCOUNTER
Pt notified that her UA results were not back yet, advised pt I would give her a call back when I receive these results

## 2019-10-31 RX ORDER — NITROFURANTOIN 25; 75 MG/1; MG/1
100 CAPSULE ORAL 2 TIMES DAILY
Qty: 7 CAPSULE | Refills: 0 | Status: SHIPPED | OUTPATIENT
Start: 2019-10-31 | End: 2019-11-20

## 2019-11-08 ENCOUNTER — TELEPHONE (OUTPATIENT)
Dept: INTERNAL MEDICINE | Facility: CLINIC | Age: 84
End: 2019-11-08

## 2019-11-08 RX ORDER — OXYCODONE HYDROCHLORIDE 5 MG/1
5 TABLET ORAL EVERY 8 HOURS PRN
Qty: 30 TABLET | Refills: 0 | Status: SHIPPED | OUTPATIENT
Start: 2019-11-08 | End: 2020-03-27

## 2019-11-08 RX ORDER — ONDANSETRON 4 MG/1
4 TABLET, FILM COATED ORAL EVERY 8 HOURS PRN
Qty: 20 TABLET | Refills: 0 | Status: SHIPPED | OUTPATIENT
Start: 2019-11-08 | End: 2020-01-09 | Stop reason: SDUPTHER

## 2019-11-08 NOTE — TELEPHONE ENCOUNTER
Patient is sick and needs refills asap called in for:    Ondansetron (zofran) 4 mg tab q8 hrs as needed    Oxycodone 5 mg immediate release tab 5mg q8 his as needed     [facility]la Lopez

## 2019-11-20 ENCOUNTER — RESULTS ENCOUNTER (OUTPATIENT)
Dept: INTERNAL MEDICINE | Facility: CLINIC | Age: 84
End: 2019-11-20

## 2019-11-20 ENCOUNTER — OFFICE VISIT (OUTPATIENT)
Dept: INTERNAL MEDICINE | Facility: CLINIC | Age: 84
End: 2019-11-20

## 2019-11-20 ENCOUNTER — CLINICAL SUPPORT (OUTPATIENT)
Dept: INTERNAL MEDICINE | Facility: CLINIC | Age: 84
End: 2019-11-20

## 2019-11-20 VITALS
TEMPERATURE: 98.5 F | HEART RATE: 78 BPM | BODY MASS INDEX: 20.73 KG/M2 | WEIGHT: 117 LBS | DIASTOLIC BLOOD PRESSURE: 98 MMHG | OXYGEN SATURATION: 97 % | HEIGHT: 63 IN | RESPIRATION RATE: 16 BRPM | SYSTOLIC BLOOD PRESSURE: 158 MMHG

## 2019-11-20 DIAGNOSIS — N39.0 URINARY TRACT INFECTION WITHOUT HEMATURIA, SITE UNSPECIFIED: ICD-10-CM

## 2019-11-20 DIAGNOSIS — R30.0 DYSURIA: Primary | ICD-10-CM

## 2019-11-20 DIAGNOSIS — R30.0 DYSURIA: ICD-10-CM

## 2019-11-20 DIAGNOSIS — I10 ESSENTIAL HYPERTENSION: ICD-10-CM

## 2019-11-20 LAB
BILIRUB BLD-MCNC: NEGATIVE MG/DL
CLARITY, POC: ABNORMAL
COLOR UR: YELLOW
GLUCOSE UR STRIP-MCNC: NEGATIVE MG/DL
KETONES UR QL: NEGATIVE
LEUKOCYTE EST, POC: ABNORMAL
NITRITE UR-MCNC: NEGATIVE MG/ML
PH UR: 7 [PH] (ref 5–8)
PROT UR STRIP-MCNC: NEGATIVE MG/DL
RBC # UR STRIP: ABNORMAL /UL
SP GR UR: 1.01 (ref 1–1.03)
UROBILINOGEN UR QL: NORMAL

## 2019-11-20 PROCEDURE — 81003 URINALYSIS AUTO W/O SCOPE: CPT | Performed by: INTERNAL MEDICINE

## 2019-11-20 PROCEDURE — 99214 OFFICE O/P EST MOD 30 MIN: CPT | Performed by: INTERNAL MEDICINE

## 2019-11-20 RX ORDER — LISINOPRIL 10 MG/1
10 TABLET ORAL DAILY
Qty: 30 TABLET | Refills: 1 | Status: SHIPPED | OUTPATIENT
Start: 2019-11-20 | End: 2020-02-28

## 2019-11-20 RX ORDER — NITROFURANTOIN 25; 75 MG/1; MG/1
100 CAPSULE ORAL 2 TIMES DAILY
Qty: 14 CAPSULE | Refills: 0 | Status: SHIPPED | OUTPATIENT
Start: 2019-11-20 | End: 2020-01-09

## 2019-11-20 RX ORDER — SULFAMETHOXAZOLE AND TRIMETHOPRIM 800; 160 MG/1; MG/1
1 TABLET ORAL 2 TIMES DAILY
Qty: 14 TABLET | Refills: 0 | Status: SHIPPED | OUTPATIENT
Start: 2019-11-20 | End: 2020-01-09

## 2019-11-20 NOTE — PROGRESS NOTES
Subjective   Alberta Suarez is a 84 y.o. female.     Chief Complaint   Patient presents with   • Hypertension   • Difficulty Urinating       History of Present Illness   3-day urinary frequency urgency dysuria no back pain no fever chills.  Also complains recently blood pressure is high.  Before chemotherapy patient is a blood pressure was high and the medicine was discontinued after chemotherapy.  Patient denies any headache blurry vision.  Patient started to take amlodipine today blood pressure still high for the time being.  Patient used to take amlodipine and lisinopril together    Current Outpatient Medications:   •  ALPRAZolam (XANAX) 0.25 MG tablet, Take 0.25 mg by mouth 2 (Two) Times a Day., Disp: , Rfl: 0  •  esomeprazole (NEXIUM) 40 MG packet, Take 40 mg by mouth 2 (Two) Times a Day., Disp: , Rfl:   •  ondansetron (ZOFRAN) 4 MG tablet, Take 1 tablet by mouth Every 8 (Eight) Hours As Needed for Nausea or Vomiting., Disp: 20 tablet, Rfl: 0  •  oxyCODONE (ROXICODONE) 5 MG immediate release tablet, Take 1 tablet by mouth Every 8 (Eight) Hours As Needed for Moderate Pain ., Disp: 30 tablet, Rfl: 0  •  lisinopril (PRINIVIL,ZESTRIL) 10 MG tablet, Take 1 tablet by mouth Daily., Disp: 30 tablet, Rfl: 1  •  nitrofurantoin, macrocrystal-monohydrate, (MACROBID) 100 MG capsule, Take 1 capsule by mouth 2 (Two) Times a Day., Disp: 14 capsule, Rfl: 0  •  sulfamethoxazole-trimethoprim (BACTRIM DS) 800-160 MG per tablet, Take 1 tablet by mouth 2 (Two) Times a Day., Disp: 14 tablet, Rfl: 0    The following portions of the patient's history were reviewed and updated as appropriate: allergies, current medications, past family history, past medical history, past social history, past surgical history and problem list.    Review of Systems   Constitutional: Negative.    Respiratory: Negative.    Cardiovascular: Negative.         Bp high   Gastrointestinal: Negative.    Genitourinary: Positive for dysuria, frequency and urgency.    Musculoskeletal: Negative.    Skin: Negative.    Neurological: Negative.    Psychiatric/Behavioral: Negative.        Objective   Physical Exam   Constitutional: She is oriented to person, place, and time. She appears well-nourished.   Neck: Neck supple.   Cardiovascular: Normal rate, regular rhythm and normal heart sounds.   Pulmonary/Chest: Effort normal and breath sounds normal.   Abdominal: Bowel sounds are normal.   Neurological: She is alert and oriented to person, place, and time.   Skin: Skin is warm.   Psychiatric: She has a normal mood and affect.       All tests have been reviewed.  Reviewed the urine test    Assessment/Plan   Diagnoses and all orders for this visit:    Dysuria  -     POCT urinalysis dipstick, automated    Urinary tract infection without hematuria, site unspecified start macobid, patient states bactrim not helping  -     Urine Culture - Urine, Urine, Clean Catch; Future    Essential hypertension continue amlodipine patient is going to bring me back the dosage.  Initiate lisinopril 10 mg    Other orders  -     lisinopril (PRINIVIL,ZESTRIL) 10 MG tablet; Take 1 tablet by mouth Daily.  -     sulfamethoxazole-trimethoprim (BACTRIM DS) 800-160 MG per tablet; Take 1 tablet by mouth 2 (Two) Times a Day.  -     nitrofurantoin, macrocrystal-monohydrate, (MACROBID) 100 MG capsule; Take 1 capsule by mouth 2 (Two) Times a Day.  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 MRI negative aneurysm,  MRA two small aneurysm.  follow up 6 mo  valvular heart disease. Obtain records.   hyperlipidemia unable to take Lipitor, caused blister in mouth. continue good diet  chronic intersitial cystitis, follow up with Dr. Ramirez, s/p DMSO  better.  LBP mild and watch, refill meds CT scan NSD  refuse colonscopy, mamm, again 9/21/17  flu shot patient refuses  osteoporosis on Dexa continue oscalD 500mg bid.  eso ca follow up with onc, unable to eat solid foods   Weight loss, watch for now  vitd low continue vitD3 1000iu daily  pneumovax done, prevnar ?done.  refuse shingle shot and Td

## 2020-01-09 ENCOUNTER — OFFICE VISIT (OUTPATIENT)
Dept: INTERNAL MEDICINE | Facility: CLINIC | Age: 85
End: 2020-01-09

## 2020-01-09 VITALS
DIASTOLIC BLOOD PRESSURE: 88 MMHG | TEMPERATURE: 97.5 F | HEART RATE: 62 BPM | WEIGHT: 119.12 LBS | BODY MASS INDEX: 21.11 KG/M2 | OXYGEN SATURATION: 96 % | SYSTOLIC BLOOD PRESSURE: 136 MMHG | HEIGHT: 63 IN

## 2020-01-09 DIAGNOSIS — I10 ESSENTIAL HYPERTENSION: ICD-10-CM

## 2020-01-09 DIAGNOSIS — M54.2 NECK PAIN: ICD-10-CM

## 2020-01-09 DIAGNOSIS — M54.50 LOW BACK PAIN, UNSPECIFIED BACK PAIN LATERALITY, UNSPECIFIED CHRONICITY, UNSPECIFIED WHETHER SCIATICA PRESENT: ICD-10-CM

## 2020-01-09 DIAGNOSIS — E78.5 HYPERLIPIDEMIA, UNSPECIFIED HYPERLIPIDEMIA TYPE: ICD-10-CM

## 2020-01-09 DIAGNOSIS — K21.9 GASTROESOPHAGEAL REFLUX DISEASE WITHOUT ESOPHAGITIS: ICD-10-CM

## 2020-01-09 DIAGNOSIS — N39.0 URINARY TRACT INFECTION WITHOUT HEMATURIA, SITE UNSPECIFIED: Primary | ICD-10-CM

## 2020-01-09 DIAGNOSIS — C15.9 MALIGNANT NEOPLASM OF ESOPHAGUS, UNSPECIFIED LOCATION (HCC): ICD-10-CM

## 2020-01-09 LAB
BILIRUB BLD-MCNC: NEGATIVE MG/DL
CLARITY, POC: ABNORMAL
COLOR UR: ABNORMAL
GLUCOSE UR STRIP-MCNC: NEGATIVE MG/DL
KETONES UR QL: NEGATIVE
LEUKOCYTE EST, POC: NEGATIVE
NITRITE UR-MCNC: NEGATIVE MG/ML
PH UR: 6 [PH] (ref 5–8)
PROT UR STRIP-MCNC: NEGATIVE MG/DL
RBC # UR STRIP: ABNORMAL /UL
SP GR UR: 1.03 (ref 1–1.03)
UROBILINOGEN UR QL: NORMAL

## 2020-01-09 PROCEDURE — 81003 URINALYSIS AUTO W/O SCOPE: CPT | Performed by: INTERNAL MEDICINE

## 2020-01-09 PROCEDURE — 99214 OFFICE O/P EST MOD 30 MIN: CPT | Performed by: INTERNAL MEDICINE

## 2020-01-09 RX ORDER — OMEPRAZOLE 40 MG/1
40 CAPSULE, DELAYED RELEASE ORAL 2 TIMES DAILY
Qty: 60 CAPSULE | Refills: 3 | Status: SHIPPED | OUTPATIENT
Start: 2020-01-09 | End: 2021-02-23

## 2020-01-09 RX ORDER — CIPROFLOXACIN 500 MG/1
500 TABLET, FILM COATED ORAL 2 TIMES DAILY
Qty: 14 TABLET | Refills: 0 | Status: SHIPPED | OUTPATIENT
Start: 2020-01-09 | End: 2020-01-23

## 2020-01-09 RX ORDER — OMEPRAZOLE 40 MG/1
40 CAPSULE, DELAYED RELEASE ORAL 2 TIMES DAILY
COMMUNITY
End: 2020-01-09 | Stop reason: SDUPTHER

## 2020-01-09 RX ORDER — ONDANSETRON 4 MG/1
4 TABLET, FILM COATED ORAL EVERY 8 HOURS PRN
Qty: 20 TABLET | Refills: 0 | Status: SHIPPED | OUTPATIENT
Start: 2020-01-09 | End: 2021-03-17 | Stop reason: SDUPTHER

## 2020-01-09 NOTE — PROGRESS NOTES
Subjective   Alberta Suarez is a 84 y.o. female.     Chief Complaint   Patient presents with   • Back Pain     Pt believes she has another UTI, states for the past couple of weeks she has been hurting. States she had a reaction to bactrim so she never took this back in November.       History of Present Illness   Pt is a 84 year old female that presents to the clinic for follow up. She is also having back pain with onset 2 weeks ago. She describes the pain as located in the flank/lumbar region bilaterally. She states the pain is present throughout the day. She describes the pain as sharp. She states the pain is constant regardless of position or movement. She reports relief when lying down in bed with heating paid. She denies radiation of pain. She reports the pain as a 10/10 at times.macrobid helps relieving the pain.  She denies the following symptoms: chest pain; shortness of breath; fever; gross hematuria; dysuria; urinary urgency or frequency; burning.     Also co sinus congestion and pain, running nose, PND, no yellow drainage    Pt states that her blood pressure is well controlled on medication, which she takes as prescribed. She checks her blood pressure at home, daily. Pt states that GERD is stable with Prilosec. Pt states that she primarily consumes a vegetable diet in effort to manage hyperlipidemia without medication, as she can't tolerate Lipitor. Pt no longer follows with ENT regarding hearing loss. Pt no longer follows with neurology. Pt states that she has a cardiologist, but doesn't go often because they never find any issues. Pt no longer follows with Dr. Ramirez for chronic interstitial cystitis, as it resolved. Pt was diagnosed with esophageal cancer in 2019. She has received therapy and is doing well now.     Current Outpatient Medications:   •  ALPRAZolam (XANAX) 0.25 MG tablet, Take 0.25 mg by mouth 2 (Two) Times a Day., Disp: , Rfl: 0  •  lisinopril (PRINIVIL,ZESTRIL) 10 MG tablet, Take 1  tablet by mouth Daily., Disp: 30 tablet, Rfl: 1  •  omeprazole (priLOSEC) 40 MG capsule, Take 40 mg by mouth 2 (Two) Times a Day., Disp: , Rfl:   •  ondansetron (ZOFRAN) 4 MG tablet, Take 1 tablet by mouth Every 8 (Eight) Hours As Needed for Nausea or Vomiting., Disp: 20 tablet, Rfl: 0  •  oxyCODONE (ROXICODONE) 5 MG immediate release tablet, Take 1 tablet by mouth Every 8 (Eight) Hours As Needed for Moderate Pain ., Disp: 30 tablet, Rfl: 0    The following portions of the patient's history were reviewed and updated as appropriate: allergies, current medications, past family history, past medical history, past social history, past surgical history and problem list.    Review of Systems   Constitutional: Negative.    HENT: Positive for congestion, postnasal drip, rhinorrhea, sinus pressure and sinus pain.    Respiratory: Negative.    Cardiovascular: Negative.    Gastrointestinal: Negative.    Genitourinary: Positive for urgency. Negative for dysuria and frequency.   Musculoskeletal: Positive for back pain.   Skin: Negative.    Neurological: Negative.    Psychiatric/Behavioral: Negative.        Objective   Physical Exam   Constitutional: She is oriented to person, place, and time. She appears well-developed and well-nourished.   HENT:   TM bulge   Neck: Neck supple.   Cardiovascular: Normal rate, regular rhythm and normal heart sounds.   Pulmonary/Chest: Effort normal and breath sounds normal.   Abdominal: Soft. Bowel sounds are normal.   Musculoskeletal: She exhibits no tenderness.   Neurological: She is alert and oriented to person, place, and time.   Psychiatric: She has a normal mood and affect. Her behavior is normal.       All tests have been reviewed.    Assessment/Plan   Diagnoses and all orders for this visit:    Urinary tract infection without hematuria, site unspecified: Order urine microscopy. Start cipro increase fluids    Sinusitis start zyrtec    Hyperlipidemia, unspecified hyperlipidemia type:  Controlled. Continue diet.    Essential hypertension: Controlled. Continue medication.     Gastroesophageal reflux disease without esophagitis: Stable. Continue medication.     Malignant neoplasm of esophagus, unspecified location (CMS/HCC): Continue to follow up with Oncology.    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 MRI negative aneurysm,  MRA two small aneurysm.  follow up 6 mo  valvular heart disease. Obtain records.   hyperlipidemia unable to take Lipitor, caused blister in mouth. continue good diet  chronic intersitial cystitis, follow up with Dr. Ramirez, s/p DMSO better.  LBP mild and watch, refill meds CT scan NSD  refuse colonscopy, mamm, again 9/21/17  flu shot patient refuses  osteoporosis on Dexa continue oscalD 500mg bid.  eso ca follow up with onc, unable to eat solid foods   Weight loss, watch for now  vitd low continue vitD3 1000iu daily  pneumovax done, prevnar ?done.  refuse shingle shot and Td

## 2020-01-23 ENCOUNTER — OFFICE VISIT (OUTPATIENT)
Dept: INTERNAL MEDICINE | Facility: CLINIC | Age: 85
End: 2020-01-23

## 2020-01-23 VITALS
DIASTOLIC BLOOD PRESSURE: 84 MMHG | TEMPERATURE: 97.8 F | SYSTOLIC BLOOD PRESSURE: 128 MMHG | HEART RATE: 68 BPM | OXYGEN SATURATION: 97 % | WEIGHT: 119 LBS | HEIGHT: 63 IN | BODY MASS INDEX: 21.09 KG/M2

## 2020-01-23 DIAGNOSIS — K21.9 GASTROESOPHAGEAL REFLUX DISEASE WITHOUT ESOPHAGITIS: ICD-10-CM

## 2020-01-23 DIAGNOSIS — N30.10 CHRONIC INTERSTITIAL CYSTITIS: ICD-10-CM

## 2020-01-23 DIAGNOSIS — N39.0 URINARY TRACT INFECTION WITHOUT HEMATURIA, SITE UNSPECIFIED: Primary | ICD-10-CM

## 2020-01-23 DIAGNOSIS — M81.0 OSTEOPOROSIS, UNSPECIFIED OSTEOPOROSIS TYPE, UNSPECIFIED PATHOLOGICAL FRACTURE PRESENCE: ICD-10-CM

## 2020-01-23 DIAGNOSIS — E78.5 HYPERLIPIDEMIA, UNSPECIFIED HYPERLIPIDEMIA TYPE: ICD-10-CM

## 2020-01-23 DIAGNOSIS — J32.9 SINUSITIS, UNSPECIFIED CHRONICITY, UNSPECIFIED LOCATION: ICD-10-CM

## 2020-01-23 DIAGNOSIS — I10 ESSENTIAL HYPERTENSION: ICD-10-CM

## 2020-01-23 PROBLEM — M54.50 LOW BACK PAIN: Status: RESOLVED | Noted: 2019-05-28 | Resolved: 2020-01-23

## 2020-01-23 LAB
25(OH)D3+25(OH)D2 SERPL-MCNC: 13.9 NG/ML (ref 30–100)
ALBUMIN SERPL-MCNC: 4.4 G/DL (ref 3.5–5.2)
ALBUMIN/GLOB SERPL: 1.8 G/DL
ALP SERPL-CCNC: 61 U/L (ref 39–117)
ALT SERPL-CCNC: 7 U/L (ref 1–33)
AST SERPL-CCNC: 14 U/L (ref 1–32)
BASOPHILS # BLD AUTO: 0.04 10*3/MM3 (ref 0–0.2)
BASOPHILS NFR BLD AUTO: 0.8 % (ref 0–1.5)
BILIRUB SERPL-MCNC: 0.4 MG/DL (ref 0.2–1.2)
BUN SERPL-MCNC: 15 MG/DL (ref 8–23)
BUN/CREAT SERPL: 15 (ref 7–25)
CALCIUM SERPL-MCNC: 9.6 MG/DL (ref 8.6–10.5)
CHLORIDE SERPL-SCNC: 104 MMOL/L (ref 98–107)
CHOLEST SERPL-MCNC: 191 MG/DL (ref 0–200)
CO2 SERPL-SCNC: 25.1 MMOL/L (ref 22–29)
CREAT SERPL-MCNC: 1 MG/DL (ref 0.57–1)
EOSINOPHIL # BLD AUTO: 0.05 10*3/MM3 (ref 0–0.4)
EOSINOPHIL NFR BLD AUTO: 1 % (ref 0.3–6.2)
ERYTHROCYTE [DISTWIDTH] IN BLOOD BY AUTOMATED COUNT: 13.6 % (ref 12.3–15.4)
GLOBULIN SER CALC-MCNC: 2.5 GM/DL
GLUCOSE SERPL-MCNC: 102 MG/DL (ref 65–99)
HCT VFR BLD AUTO: 35.3 % (ref 34–46.6)
HDLC SERPL-MCNC: 102 MG/DL (ref 40–60)
HGB BLD-MCNC: 11.8 G/DL (ref 12–15.9)
IMM GRANULOCYTES # BLD AUTO: 0.01 10*3/MM3 (ref 0–0.05)
IMM GRANULOCYTES NFR BLD AUTO: 0.2 % (ref 0–0.5)
LDLC SERPL CALC-MCNC: 79 MG/DL (ref 0–100)
LYMPHOCYTES # BLD AUTO: 1.59 10*3/MM3 (ref 0.7–3.1)
LYMPHOCYTES NFR BLD AUTO: 31.4 % (ref 19.6–45.3)
MCH RBC QN AUTO: 30.4 PG (ref 26.6–33)
MCHC RBC AUTO-ENTMCNC: 33.4 G/DL (ref 31.5–35.7)
MCV RBC AUTO: 91 FL (ref 79–97)
MONOCYTES # BLD AUTO: 0.56 10*3/MM3 (ref 0.1–0.9)
MONOCYTES NFR BLD AUTO: 11 % (ref 5–12)
NEUTROPHILS # BLD AUTO: 2.82 10*3/MM3 (ref 1.7–7)
NEUTROPHILS NFR BLD AUTO: 55.6 % (ref 42.7–76)
NRBC BLD AUTO-RTO: 0 /100 WBC (ref 0–0.2)
PLATELET # BLD AUTO: 240 10*3/MM3 (ref 140–450)
POTASSIUM SERPL-SCNC: 4.5 MMOL/L (ref 3.5–5.2)
PROT SERPL-MCNC: 6.9 G/DL (ref 6–8.5)
RBC # BLD AUTO: 3.88 10*6/MM3 (ref 3.77–5.28)
SODIUM SERPL-SCNC: 141 MMOL/L (ref 136–145)
TRIGL SERPL-MCNC: 52 MG/DL (ref 0–150)
TSH SERPL DL<=0.005 MIU/L-ACNC: 1.81 UIU/ML (ref 0.27–4.2)
VIT B12 SERPL-MCNC: 279 PG/ML (ref 211–946)
VLDLC SERPL CALC-MCNC: 10.4 MG/DL
WBC # BLD AUTO: 5.07 10*3/MM3 (ref 3.4–10.8)

## 2020-01-23 PROCEDURE — 99214 OFFICE O/P EST MOD 30 MIN: CPT | Performed by: INTERNAL MEDICINE

## 2020-01-23 NOTE — PROGRESS NOTES
Subjective   Alberta Suarez is a 84 y.o. female.     Chief Complaint   Patient presents with   • Follow-up     2 wk f/u labs        History of Present Illness   Patient here for follow-up.  UTI improved on medication.  The sinusitis improved on medication.  Hyperlipidemia stable on medication.  Hypertension stable on medication.  GERD stable.  Osteoporosis below is historical record only: Need to follow-up with DEXA scan.  Interstitial cystitis stable    Current Outpatient Medications:   •  ALPRAZolam (XANAX) 0.25 MG tablet, Take 0.25 mg by mouth 2 (Two) Times a Day., Disp: , Rfl: 0  •  lisinopril (PRINIVIL,ZESTRIL) 10 MG tablet, Take 1 tablet by mouth Daily., Disp: 30 tablet, Rfl: 1  •  omeprazole (priLOSEC) 40 MG capsule, Take 1 capsule by mouth 2 (Two) Times a Day., Disp: 60 capsule, Rfl: 3  •  ondansetron (ZOFRAN) 4 MG tablet, Take 1 tablet by mouth Every 8 (Eight) Hours As Needed for Nausea or Vomiting., Disp: 20 tablet, Rfl: 0  •  oxyCODONE (ROXICODONE) 5 MG immediate release tablet, Take 1 tablet by mouth Every 8 (Eight) Hours As Needed for Moderate Pain ., Disp: 30 tablet, Rfl: 0    The following portions of the patient's history were reviewed and updated as appropriate: allergies, current medications, past family history, past medical history, past social history, past surgical history and problem list.    Review of Systems   Constitutional: Negative.    Respiratory: Negative.    Cardiovascular: Negative.    Gastrointestinal: Negative.    Musculoskeletal: Negative.    Skin: Negative.    Neurological: Negative.    Psychiatric/Behavioral: Negative.        Objective   Physical Exam   Constitutional: She is oriented to person, place, and time. She appears well-nourished.   Neck: Neck supple.   Cardiovascular: Normal rate, regular rhythm and normal heart sounds.   Pulmonary/Chest: Effort normal and breath sounds normal.   Abdominal: Bowel sounds are normal.   Neurological: She is alert and oriented to person,  place, and time.   Skin: Skin is warm.   Psychiatric: She has a normal mood and affect.       All tests have been reviewed.    Assessment/Plan   Diagnoses and all orders for this visit:    Urinary tract infection without hematuria, site unspecified resolved after medication    Sinusitis, unspecified chronicity, unspecified location resolved    Hyperlipidemia, unspecified hyperlipidemia type continue medication    Essential hypertension continue medication    Gastroesophageal reflux disease without esophagitis continue medication    Osteoporosis, unspecified osteoporosis type, unspecified pathological fracture presence  -     DEXA Bone Density Axial    Chronic interstitial cystitis stable    3 mo wellness          Below is to historical records for reference only:      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 MRI negative aneurysm,  MRA two small aneurysm.  follow up 6 mo  valvular heart disease. Obtain records.   hyperlipidemia unable to take Lipitor, caused blister in mouth. continue good diet  chronic intersitial cystitis, follow up with Dr. Ramirez, s/p DMSO better.  LBP mild and watch, refill meds CT scan NSD  refuse colonscopy, mamm, again 9/21/17  flu shot patient refuses  osteoporosis on Dexa continue oscalD 500mg bid.  eso ca follow up with onc, unable to eat solid foods   Weight loss, watch for now  vitd low continue vitD3 1000iu daily  pneumovax done, prevnar ?done.  refuse shingle shot and Td

## 2020-01-30 ENCOUNTER — APPOINTMENT (OUTPATIENT)
Dept: BONE DENSITY | Facility: HOSPITAL | Age: 85
End: 2020-01-30

## 2020-01-30 PROCEDURE — 77080 DXA BONE DENSITY AXIAL: CPT

## 2020-02-07 RX ORDER — ALENDRONATE SODIUM 70 MG/1
TABLET ORAL
Qty: 14 TABLET | Refills: 0 | Status: SHIPPED | OUTPATIENT
Start: 2020-02-07 | End: 2020-10-20

## 2020-02-28 RX ORDER — LISINOPRIL 10 MG/1
10 TABLET ORAL DAILY
Qty: 30 TABLET | Refills: 0 | Status: SHIPPED | OUTPATIENT
Start: 2020-02-28 | End: 2020-04-21

## 2020-03-27 RX ORDER — OXYCODONE HYDROCHLORIDE 5 MG/1
5 TABLET ORAL EVERY 8 HOURS PRN
Qty: 30 TABLET | Refills: 0 | Status: SHIPPED | OUTPATIENT
Start: 2020-03-27 | End: 2020-06-22 | Stop reason: SDUPTHER

## 2020-04-21 RX ORDER — LISINOPRIL 10 MG/1
10 TABLET ORAL DAILY
Qty: 30 TABLET | Refills: 0 | Status: SHIPPED | OUTPATIENT
Start: 2020-04-21 | End: 2020-05-28

## 2020-05-28 RX ORDER — LISINOPRIL 10 MG/1
10 TABLET ORAL DAILY
Qty: 30 TABLET | Refills: 5 | Status: SHIPPED | OUTPATIENT
Start: 2020-05-28 | End: 2021-04-19 | Stop reason: SDUPTHER

## 2020-06-01 ENCOUNTER — TELEPHONE (OUTPATIENT)
Dept: INTERNAL MEDICINE | Facility: CLINIC | Age: 85
End: 2020-06-01

## 2020-06-01 DIAGNOSIS — R35.0 URINE FREQUENCY: Primary | ICD-10-CM

## 2020-06-01 PROCEDURE — 81003 URINALYSIS AUTO W/O SCOPE: CPT | Performed by: INTERNAL MEDICINE

## 2020-06-01 NOTE — TELEPHONE ENCOUNTER
Patient called and stated that she thinks she is getting a kidney infection and states that she can't make it in for an appointment.   She stated that her [place] is coming [place] from [place] in hospice care and would like to know if  would be willing to call her something in. Please advise.

## 2020-06-02 ENCOUNTER — CLINICAL SUPPORT (OUTPATIENT)
Dept: INTERNAL MEDICINE | Facility: CLINIC | Age: 85
End: 2020-06-02

## 2020-06-02 LAB
BILIRUB BLD-MCNC: NEGATIVE MG/DL
CLARITY, POC: CLEAR
COLOR UR: YELLOW
GLUCOSE UR STRIP-MCNC: NEGATIVE MG/DL
KETONES UR QL: NEGATIVE
LEUKOCYTE EST, POC: NEGATIVE
NITRITE UR-MCNC: NEGATIVE MG/ML
PH UR: 6 [PH] (ref 5–8)
PROT UR STRIP-MCNC: NEGATIVE MG/DL
RBC # UR STRIP: NEGATIVE /UL
SP GR UR: 1.02 (ref 1–1.03)
UROBILINOGEN UR QL: NORMAL

## 2020-06-03 ENCOUNTER — TELEPHONE (OUTPATIENT)
Dept: INTERNAL MEDICINE | Facility: CLINIC | Age: 85
End: 2020-06-03

## 2020-06-03 NOTE — TELEPHONE ENCOUNTER
Patient informed of UA results - she is still having back pain, it is worse today.    Please advise on treatment

## 2020-06-19 NOTE — TELEPHONE ENCOUNTER
Patient requested a refill of oxyCODONE (ROXICODONE) 5 MG immediate release tablet. Patient stated she is completely out of medication and needs this refilled as soon as possible as she is going out of town 6/20/20 and will not be back for a week.    Please call and advise. Patient call back [phone]    [facility] - [place], [place] - [address] - [phone] PH - [phone] FX

## 2020-06-20 RX ORDER — OXYCODONE HYDROCHLORIDE 5 MG/1
5 TABLET ORAL EVERY 8 HOURS PRN
Qty: 30 TABLET | Refills: 0 | OUTPATIENT
Start: 2020-06-20

## 2020-06-22 ENCOUNTER — ANESTHESIA EVENT (OUTPATIENT)
Dept: GASTROENTEROLOGY | Facility: HOSPITAL | Age: 85
End: 2020-06-22

## 2020-06-22 ENCOUNTER — ANESTHESIA (OUTPATIENT)
Dept: GASTROENTEROLOGY | Facility: HOSPITAL | Age: 85
End: 2020-06-22

## 2020-06-22 ENCOUNTER — HOSPITAL ENCOUNTER (EMERGENCY)
Facility: HOSPITAL | Age: 85
Discharge: HOME | End: 2020-06-22
Attending: EMERGENCY MEDICINE | Admitting: INTERNAL MEDICINE

## 2020-06-22 VITALS
BODY MASS INDEX: 20.3 KG/M2 | SYSTOLIC BLOOD PRESSURE: 148 MMHG | DIASTOLIC BLOOD PRESSURE: 58 MMHG | HEART RATE: 70 BPM | TEMPERATURE: 97.4 F | OXYGEN SATURATION: 100 % | WEIGHT: 114.6 LBS | HEIGHT: 63 IN | RESPIRATION RATE: 16 BRPM

## 2020-06-22 DIAGNOSIS — T18.128A ESOPHAGEAL OBSTRUCTION DUE TO FOOD IMPACTION: Primary | ICD-10-CM

## 2020-06-22 DIAGNOSIS — K22.2 ESOPHAGEAL OBSTRUCTION DUE TO FOOD IMPACTION: Primary | ICD-10-CM
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
## 2020-06-22 LAB
ALBUMIN SERPL-MCNC: 4.6 G/DL (ref 3.5–5.2)
ALBUMIN/GLOB SERPL: 1.3 G/DL
ALP SERPL-CCNC: 76 U/L (ref 39–117)
ALT SERPL W P-5'-P-CCNC: 9 U/L (ref 1–33)
ANION GAP SERPL CALCULATED.3IONS-SCNC: 12.9 MMOL/L (ref 5–15)
APTT PPP: 29.3 SECONDS (ref 24.5–37.2)
AST SERPL-CCNC: 19 U/L (ref 1–32)
BASOPHILS # BLD AUTO: 0.02 10*3/MM3 (ref 0–0.2)
BASOPHILS NFR BLD AUTO: 0.4 % (ref 0–1.5)
BILIRUB SERPL-MCNC: 0.4 MG/DL (ref 0.2–1.2)
BUN BLD-MCNC: 17 MG/DL (ref 8–23)
BUN/CREAT SERPL: 16.3 (ref 7–25)
CALCIUM SPEC-SCNC: 10.1 MG/DL (ref 8.6–10.5)
CHLORIDE SERPL-SCNC: 104 MMOL/L (ref 98–107)
CO2 SERPL-SCNC: 24.1 MMOL/L (ref 22–29)
CREAT BLD-MCNC: 1.04 MG/DL (ref 0.57–1)
DEPRECATED RDW RBC AUTO: 45.5 FL (ref 37–54)
EOSINOPHIL # BLD AUTO: 0.04 10*3/MM3 (ref 0–0.4)
EOSINOPHIL NFR BLD AUTO: 0.7 % (ref 0.3–6.2)
ERYTHROCYTE [DISTWIDTH] IN BLOOD BY AUTOMATED COUNT: 13.5 % (ref 12.3–15.4)
GFR SERPL CREATININE-BSD FRML MDRD: 51 ML/MIN/1.73
GLOBULIN UR ELPH-MCNC: 3.5 GM/DL
GLUCOSE BLD-MCNC: 114 MG/DL (ref 65–99)
GLUCOSE BLDC GLUCOMTR-MCNC: 110 MG/DL (ref 70–130)
HCT VFR BLD AUTO: 37.9 % (ref 34–46.6)
HGB BLD-MCNC: 12.9 G/DL (ref 12–15.9)
HOLD SPECIMEN: NORMAL
HOLD SPECIMEN: NORMAL
IMM GRANULOCYTES # BLD AUTO: 0.01 10*3/MM3 (ref 0–0.05)
IMM GRANULOCYTES NFR BLD AUTO: 0.2 % (ref 0–0.5)
INR PPP: 0.99 (ref 0.9–1.1)
LYMPHOCYTES # BLD AUTO: 1.73 10*3/MM3 (ref 0.7–3.1)
LYMPHOCYTES NFR BLD AUTO: 32 % (ref 19.6–45.3)
MCH RBC QN AUTO: 31 PG (ref 26.6–33)
MCHC RBC AUTO-ENTMCNC: 34 G/DL (ref 31.5–35.7)
MCV RBC AUTO: 91.1 FL (ref 79–97)
MONOCYTES # BLD AUTO: 0.5 10*3/MM3 (ref 0.1–0.9)
MONOCYTES NFR BLD AUTO: 9.3 % (ref 5–12)
NEUTROPHILS # BLD AUTO: 3.1 10*3/MM3 (ref 1.7–7)
NEUTROPHILS NFR BLD AUTO: 57.4 % (ref 42.7–76)
NRBC BLD AUTO-RTO: 0 /100 WBC (ref 0–0.2)
PLATELET # BLD AUTO: 245 10*3/MM3 (ref 140–450)
PMV BLD AUTO: 10.3 FL (ref 6–12)
POTASSIUM BLD-SCNC: 4.2 MMOL/L (ref 3.5–5.2)
PROT SERPL-MCNC: 8.1 G/DL (ref 6–8.5)
PROTHROMBIN TIME: 13.5 SECONDS (ref 12–15.1)
RBC # BLD AUTO: 4.16 10*6/MM3 (ref 3.77–5.28)
SARS-COV-2 RNA PNL SPEC NAA+PROBE: NOT DETECTED
SODIUM BLD-SCNC: 141 MMOL/L (ref 136–145)
WBC NRBC COR # BLD: 5.4 10*3/MM3 (ref 3.4–10.8)
WHOLE BLOOD HOLD SPECIMEN: NORMAL
WHOLE BLOOD HOLD SPECIMEN: NORMAL

## 2020-06-22 PROCEDURE — 25010000002 ONDANSETRON PER 1 MG: Performed by: EMERGENCY MEDICINE

## 2020-06-22 PROCEDURE — 99284 EMERGENCY DEPT VISIT MOD MDM: CPT

## 2020-06-22 PROCEDURE — 85730 THROMBOPLASTIN TIME PARTIAL: CPT | Performed by: EMERGENCY MEDICINE

## 2020-06-22 PROCEDURE — 96375 TX/PRO/DX INJ NEW DRUG ADDON: CPT

## 2020-06-22 PROCEDURE — 85025 COMPLETE CBC W/AUTO DIFF WBC: CPT | Performed by: EMERGENCY MEDICINE

## 2020-06-22 PROCEDURE — 93005 ELECTROCARDIOGRAM TRACING: CPT | Performed by: EMERGENCY MEDICINE

## 2020-06-22 PROCEDURE — 80053 COMPREHEN METABOLIC PANEL: CPT | Performed by: EMERGENCY MEDICINE

## 2020-06-22 PROCEDURE — 99283 EMERGENCY DEPT VISIT LOW MDM: CPT | Performed by: INTERNAL MEDICINE

## 2020-06-22 PROCEDURE — 96374 THER/PROPH/DIAG INJ IV PUSH: CPT

## 2020-06-22 PROCEDURE — 82962 GLUCOSE BLOOD TEST: CPT

## 2020-06-22 PROCEDURE — 85610 PROTHROMBIN TIME: CPT | Performed by: EMERGENCY MEDICINE

## 2020-06-22 PROCEDURE — 87635 SARS-COV-2 COVID-19 AMP PRB: CPT | Performed by: INTERNAL MEDICINE

## 2020-06-22 PROCEDURE — 25010000002 GLUCAGON (HUMAN RECOMBINANT) 1 MG RECONSTITUTED SOLUTION: Performed by: EMERGENCY MEDICINE

## 2020-06-22 PROCEDURE — 25010000002 PROPOFOL 10 MG/ML EMULSION: Performed by: NURSE ANESTHETIST, CERTIFIED REGISTERED

## 2020-06-22 PROCEDURE — C1726 CATH, BAL DIL, NON-VASCULAR: HCPCS | Performed by: INTERNAL MEDICINE

## 2020-06-22 PROCEDURE — 25010000002 DIAZEPAM PER 5 MG: Performed by: EMERGENCY MEDICINE

## 2020-06-22 PROCEDURE — 88342 IMHCHEM/IMCYTCHM 1ST ANTB: CPT | Performed by: INTERNAL MEDICINE

## 2020-06-22 PROCEDURE — 88305 TISSUE EXAM BY PATHOLOGIST: CPT | Performed by: INTERNAL MEDICINE

## 2020-06-22 RX ORDER — SODIUM CHLORIDE 9 MG/ML
70 INJECTION, SOLUTION INTRAVENOUS CONTINUOUS PRN
Status: DISCONTINUED | OUTPATIENT
Start: 2020-06-22 | End: 2020-06-22 | Stop reason: HOSPADM

## 2020-06-22 RX ORDER — DIAZEPAM 5 MG/ML
2.5 INJECTION, SOLUTION INTRAMUSCULAR; INTRAVENOUS ONCE
Status: COMPLETED | OUTPATIENT
Start: 2020-06-22 | End: 2020-06-22

## 2020-06-22 RX ORDER — LIDOCAINE HYDROCHLORIDE 20 MG/ML
INJECTION, SOLUTION INTRAVENOUS AS NEEDED
Status: DISCONTINUED | OUTPATIENT
Start: 2020-06-22 | End: 2020-06-22 | Stop reason: SURG

## 2020-06-22 RX ORDER — OXYCODONE HYDROCHLORIDE 5 MG/1
5 TABLET ORAL EVERY 8 HOURS PRN
Qty: 30 TABLET | Refills: 0 | Status: SHIPPED | OUTPATIENT
Start: 2020-06-22 | End: 2020-09-01 | Stop reason: SDUPTHER

## 2020-06-22 RX ORDER — SODIUM CHLORIDE 0.9 % (FLUSH) 0.9 %
10 SYRINGE (ML) INJECTION AS NEEDED
Status: DISCONTINUED | OUTPATIENT
Start: 2020-06-22 | End: 2020-06-22 | Stop reason: HOSPADM

## 2020-06-22 RX ORDER — PROPOFOL 10 MG/ML
VIAL (ML) INTRAVENOUS AS NEEDED
Status: DISCONTINUED | OUTPATIENT
Start: 2020-06-22 | End: 2020-06-22 | Stop reason: SURG

## 2020-06-22 RX ORDER — ONDANSETRON 2 MG/ML
4 INJECTION INTRAMUSCULAR; INTRAVENOUS ONCE
Status: COMPLETED | OUTPATIENT
Start: 2020-06-22 | End: 2020-06-22

## 2020-06-22 RX ADMIN — PROPOFOL 30 MG: 10 INJECTION, EMULSION INTRAVENOUS at 15:05

## 2020-06-22 RX ADMIN — PROPOFOL 80 MG: 10 INJECTION, EMULSION INTRAVENOUS at 14:58

## 2020-06-22 RX ADMIN — DIAZEPAM 2.5 MG: 5 INJECTION, SOLUTION INTRAMUSCULAR; INTRAVENOUS at 11:30

## 2020-06-22 RX ADMIN — ONDANSETRON 4 MG: 2 INJECTION INTRAMUSCULAR; INTRAVENOUS at 11:23

## 2020-06-22 RX ADMIN — PROPOFOL 80 MG: 10 INJECTION, EMULSION INTRAVENOUS at 15:02

## 2020-06-22 RX ADMIN — LIDOCAINE HYDROCHLORIDE 80 MG: 20 INJECTION, SOLUTION INTRAVENOUS at 14:58

## 2020-06-22 RX ADMIN — SODIUM CHLORIDE: 9 INJECTION, SOLUTION INTRAVENOUS at 14:54

## 2020-06-22 RX ADMIN — GLUCAGON HYDROCHLORIDE 1 MG: KIT at 11:25

## 2020-06-22 NOTE — ANESTHESIA POSTPROCEDURE EVALUATION
Patient: Alberta Suarez    Procedure Summary     Date:  06/22/20 Room / Location:  [facility] ENDOSCOPY 2 / [facility] ENDOSCOPY    Anesthesia Start:  1454 Anesthesia Stop:      Procedure:  ESOPHAGOGASTRODUODENOSCOPY WITH DILATATION (N/A ) Diagnosis:       Esophageal obstruction due to food impaction      (Esophageal obstruction due to food impaction [K22.2, T18.128A])    Surgeon:  Genia Ricketts MD Provider:  Justin Mendoza CRNA    Anesthesia Type:  MAC ASA Status:  3          Anesthesia Type: MAC    Vitals  HR 83  Sat 98  Resp 28  /59  Temp 98        Post Anesthesia Care and Evaluation    Patient location during evaluation: bedside  Patient participation: complete - patient participated  Level of consciousness: awake and alert  Pain score: 0  Pain management: adequate  Airway patency: patent  Anesthetic complications: No anesthetic complications  PONV Status: none  Cardiovascular status: acceptable  Respiratory status: acceptable  Hydration status: acceptable

## 2020-06-22 NOTE — ANESTHESIA PREPROCEDURE EVALUATION
Anesthesia Evaluation     Patient summary reviewed and Nursing notes reviewed   no history of anesthetic complications:  NPO Solid Status: > 8 hours  NPO Liquid Status: > 8 hours           Airway   Mallampati: I  TM distance: >3 FB  Neck ROM: full  no difficulty expected  Dental - normal exam   (+) lower dentures and upper dentures    Pulmonary - normal exam   Cardiovascular - normal exam    Rhythm: regular  Rate: normal    (+) hypertension, PVD, hyperlipidemia,       Neuro/Psych  (+) dizziness/light headedness,     GI/Hepatic/Renal/Endo    (+)  GERD,      Musculoskeletal     (+) neck pain,   Abdominal  - normal exam    Abdomen: soft.  Bowel sounds: normal.   Substance History      OB/GYN          Other      history of cancer                    Anesthesia Plan    ASA 3     MAC   (Risks and benefits discussed including risk of aspiration, recall and dental damage. All patient questions answered. Will continue with POC.)  intravenous induction     Anesthetic plan, all risks, benefits, and alternatives have been provided, discussed and informed consent has been obtained with: patient.

## 2020-06-23 ENCOUNTER — EPISODE CHANGES (OUTPATIENT)
Dept: CASE MANAGEMENT | Facility: OTHER | Age: 85
End: 2020-06-23

## 2020-07-29 LAB
LAB AP CASE REPORT: NORMAL
PATH REPORT.ADDENDUM SPEC: NORMAL
PATH REPORT.FINAL DX SPEC: NORMAL

## 2020-07-30 ENCOUNTER — OFFICE VISIT (OUTPATIENT)
Dept: INTERNAL MEDICINE | Facility: CLINIC | Age: 85
End: 2020-07-30

## 2020-07-30 VITALS
OXYGEN SATURATION: 97 % | TEMPERATURE: 97.7 F | BODY MASS INDEX: 20.73 KG/M2 | RESPIRATION RATE: 16 BRPM | WEIGHT: 117 LBS | DIASTOLIC BLOOD PRESSURE: 74 MMHG | SYSTOLIC BLOOD PRESSURE: 144 MMHG | HEIGHT: 63 IN | HEART RATE: 75 BPM

## 2020-07-30 DIAGNOSIS — K21.9 GASTROESOPHAGEAL REFLUX DISEASE WITHOUT ESOPHAGITIS: ICD-10-CM

## 2020-07-30 DIAGNOSIS — I10 ESSENTIAL HYPERTENSION: ICD-10-CM

## 2020-07-30 DIAGNOSIS — R10.9 FLANK PAIN: ICD-10-CM

## 2020-07-30 DIAGNOSIS — E53.8 B12 DEFICIENCY: ICD-10-CM

## 2020-07-30 DIAGNOSIS — K22.2 ESOPHAGEAL OBSTRUCTION DUE TO FOOD IMPACTION: ICD-10-CM

## 2020-07-30 DIAGNOSIS — M81.0 OSTEOPOROSIS, UNSPECIFIED OSTEOPOROSIS TYPE, UNSPECIFIED PATHOLOGICAL FRACTURE PRESENCE: ICD-10-CM

## 2020-07-30 DIAGNOSIS — R35.0 URINE FREQUENCY: ICD-10-CM

## 2020-07-30 DIAGNOSIS — E78.5 HYPERLIPIDEMIA, UNSPECIFIED HYPERLIPIDEMIA TYPE: ICD-10-CM

## 2020-07-30 DIAGNOSIS — C15.9 MALIGNANT NEOPLASM OF ESOPHAGUS, UNSPECIFIED LOCATION (HCC): ICD-10-CM

## 2020-07-30 DIAGNOSIS — R73.9 HYPERGLYCEMIA: ICD-10-CM

## 2020-07-30 DIAGNOSIS — T18.128A ESOPHAGEAL OBSTRUCTION DUE TO FOOD IMPACTION: ICD-10-CM

## 2020-07-30 DIAGNOSIS — N30.10 CHRONIC INTERSTITIAL CYSTITIS: ICD-10-CM

## 2020-07-30 DIAGNOSIS — K22.2 ESOPHAGEAL STRICTURE: ICD-10-CM

## 2020-07-30 DIAGNOSIS — R73.03 PREDIABETES: ICD-10-CM

## 2020-07-30 DIAGNOSIS — E55.9 VITAMIN D DEFICIENCY: ICD-10-CM

## 2020-07-30 DIAGNOSIS — R31.9 HEMATURIA, UNSPECIFIED TYPE: ICD-10-CM

## 2020-07-30 DIAGNOSIS — I67.1 CEREBRAL ANEURYSM: Primary | ICD-10-CM

## 2020-07-30 PROBLEM — J32.9 SINUSITIS: Status: RESOLVED | Noted: 2020-01-23 | Resolved: 2020-07-30
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
## 2020-07-30 LAB
BILIRUB BLD-MCNC: NEGATIVE MG/DL
CLARITY, POC: CLEAR
COLOR UR: YELLOW
GLUCOSE UR STRIP-MCNC: NEGATIVE MG/DL
KETONES UR QL: NEGATIVE
LEUKOCYTE EST, POC: NEGATIVE
NITRITE UR-MCNC: NEGATIVE MG/ML
PH UR: 6 [PH] (ref 5–8)
PROT UR STRIP-MCNC: NEGATIVE MG/DL
RBC # UR STRIP: ABNORMAL /UL
SP GR UR: 1.01 (ref 1–1.03)
UROBILINOGEN UR QL: NORMAL

## 2020-07-30 PROCEDURE — 81003 URINALYSIS AUTO W/O SCOPE: CPT | Performed by: INTERNAL MEDICINE

## 2020-07-30 PROCEDURE — G0439 PPPS, SUBSEQ VISIT: HCPCS | Performed by: INTERNAL MEDICINE

## 2020-07-30 PROCEDURE — 96160 PT-FOCUSED HLTH RISK ASSMT: CPT | Performed by: INTERNAL MEDICINE

## 2020-07-30 PROCEDURE — 99213 OFFICE O/P EST LOW 20 MIN: CPT | Performed by: INTERNAL MEDICINE

## 2020-07-30 PROCEDURE — 99397 PER PM REEVAL EST PAT 65+ YR: CPT | Performed by: INTERNAL MEDICINE

## 2020-07-30 NOTE — PROGRESS NOTES
The ABCs of the Annual Wellness Visit  Subsequent Medicare Wellness Visit    Chief Complaint   Patient presents with   • Medicare Wellness-subsequent       Subjective   History of Present Illness:  Alberta Suarez is a 85 y.o. female who presents for a Subsequent Medicare Wellness Visit.  Patient here for Medicare wellness also has medical problems to be addressed.  Patient complains 3-day right flank pain radiated to right groin area.  Denies any urinary symptoms no fever chills.  Patient does have chronic low back pain for which patient is taking narcotic pain medicine.  Blood pressure stable on medication.  The GERD stable.  Recent esophageal stricture dysphagia status post dilation again.  History of esophageal cancer.  Weight is stable now.  Sugar slightly elevated.  Hyperlipidemia stable now.  HEALTH RISK ASSESSMENT    Recent Hospitalizations:  No hospitalization(s) within the last year.    Current Medical Providers:  Patient Care Team:  Gonzalo Garrett MD as PCP - General    Smoking Status:  Social History     Tobacco Use   Smoking Status Never Smoker   Smokeless Tobacco Never Used       Alcohol Consumption:  Social History     Substance and Sexual Activity   Alcohol Use No       Depression Screen:   PHQ-2/PHQ-9 Depression Screening 7/30/2020   Little interest or pleasure in doing things 0   Feeling down, depressed, or hopeless 0   Total Score 0       Fall Risk Screen:  STEADI Fall Risk Assessment has not been completed.    Health Habits and Functional and Cognitive Screening:  Functional & Cognitive Status 7/30/2020   Do you have difficulty preparing food and eating? No   Do you have difficulty bathing yourself, getting dressed or grooming yourself? No   Do you have difficulty using the toilet? No   Do you have difficulty moving around from place to place? No   Do you have trouble with steps or getting out of a bed or a chair? No   Current Diet Well Balanced Diet   Dental Exam Not up to date   Eye Exam Not up  to date   Exercise (times per week) 0 times per week   Current Exercise Activities Include None   Do you need help using the phone?  No   Are you deaf or do you have serious difficulty hearing?  No   Do you need help with transportation? No   Do you need help shopping? No   Do you need help preparing meals?  No   Do you need help with housework?  No   Do you need help with laundry? No   Do you need help taking your medications? No   Do you need help managing money? No   Do you ever drive or ride in a car without wearing a seat belt? No   Have you felt unusual stress, anger or loneliness in the last month? No   Who do you live with? Spouse   If you need help, do you have trouble finding someone available to you? No   Have you been bothered in the last four weeks by sexual problems? No   Do you have difficulty concentrating, remembering or making decisions? No         Does the patient have evidence of cognitive impairment? No    Asprin use counseling:Does not need ASA (and currently is not on it)    Age-appropriate Screening Schedule:  Refer to the list below for future screening recommendations based on patient's age, sex and/or medical conditions. Orders for these recommended tests are listed in the plan section. The patient has been provided with a written plan.    Health Maintenance   Topic Date Due   • TDAP/TD VACCINES (1 - Tdap) 06/03/1946   • ZOSTER VACCINE (1 of 2) 06/03/1987   • MAMMOGRAM  06/09/2016   • INFLUENZA VACCINE  08/01/2020   • LIPID PANEL  01/23/2021   • DXA SCAN  01/30/2022          The following portions of the patient's history were reviewed and updated as appropriate: allergies, current medications, past family history, past medical history, past social history, past surgical history and problem list.    Outpatient Medications Prior to Visit   Medication Sig Dispense Refill   • alendronate (FOSAMAX) 70 MG tablet Take 1 Tablet by mouth once a week 14 tablet 0   • ALPRAZolam (XANAX) 0.25 MG  "tablet Take 0.25 mg by mouth 2 (Two) Times a Day.  0   • calcium-vitamin D (OSCAL 500/200 D-3) 500-200 MG-UNIT per tablet Take 1 tablet by mouth Daily. 30 tablet 0   • lisinopril (PRINIVIL,ZESTRIL) 10 MG tablet TAKE 1 TABLET BY MOUTH DAILY. 30 tablet 5   • omeprazole (priLOSEC) 40 MG capsule Take 1 capsule by mouth 2 (Two) Times a Day. 60 capsule 3   • ondansetron (ZOFRAN) 4 MG tablet Take 1 tablet by mouth Every 8 (Eight) Hours As Needed for Nausea or Vomiting. 20 tablet 0   • oxyCODONE (ROXICODONE) 5 MG immediate release tablet Take 1 tablet by mouth Every 8 (Eight) Hours As Needed for Moderate Pain . 30 tablet 0     No facility-administered medications prior to visit.        Patient Active Problem List   Diagnosis   • Cerebral aneurysm   • Chronic interstitial cystitis   • Gastroesophageal reflux disease without esophagitis   • Hyperlipidemia   • Hypertension   • Osteoporosis   • Urinary tract infection without hematuria   • Malignant neoplasm of esophagus (CMS/HCC)   • Esophageal stricture       Advanced Care Planning:  ACP discussion was held with the patient during this visit. Patient does not have an advance directive, declines further assistance.    Review of Systems   Constitutional: Negative.    Respiratory: Negative.    Cardiovascular: Negative.    Gastrointestinal: Negative.    Musculoskeletal: Positive for back pain.   Skin: Negative.    Neurological: Negative.    Psychiatric/Behavioral: Negative.        Compared to one year ago, the patient feels her physical health is the same.  Compared to one year ago, the patient feels her mental health is the same.    Reviewed chart for potential of high risk medication in the elderly: yes  Reviewed chart for potential of harmful drug interactions in the elderly:no    Objective         Vitals:    07/30/20 1416   BP: 144/74   Pulse: 75   Resp: 16   Temp: 97.7 °F (36.5 °C)   TempSrc: Temporal   SpO2: 97%   Weight: 53.1 kg (117 lb)   Height: 160 cm (62.99\")   PainSc: " 0-No pain       Body mass index is 20.73 kg/m².  Discussed the patient's BMI with her. The BMI is in the acceptable range.    Physical Exam   Constitutional: She is oriented to person, place, and time. She appears well-nourished.   Neck: Neck supple.   Cardiovascular: Normal rate, regular rhythm and normal heart sounds.   Pulmonary/Chest: Effort normal and breath sounds normal.   Abdominal: Bowel sounds are normal.   Neurological: She is alert and oriented to person, place, and time.   Skin: Skin is warm.   Psychiatric: She has a normal mood and affect.             Assessment/Plan   Medicare Risks and Personalized Health Plan  CMS Preventative Services Quick Reference  Advance Directive Discussion    The above risks/problems have been discussed with the patient.  Pertinent information has been shared with the patient in the After Visit Summary.  Follow up plans and orders are seen below in the Assessment/Plan Section.    Diagnoses and all orders for this visit:    1. Cerebral aneurysm (Primary)    2. Hyperlipidemia, unspecified hyperlipidemia type  -     Lipid Panel    3. Essential hypertension  -     Basic Metabolic Panel    4. Gastroesophageal reflux disease without esophagitis    5. Malignant neoplasm of esophagus, unspecified location (CMS/[facility])    6. Esophageal obstruction due to food impaction    7. Esophageal stricture    8. Osteoporosis, unspecified osteoporosis type, unspecified pathological fracture presence    9. Chronic interstitial cystitis    10. Hyperglycemia  -     TSH  -     Hemoglobin A1c    11. Vitamin D deficiency  -     Vitamin D 25 Hydroxy    12. B12 deficiency  -     Vitamin B12    13. Prediabetes   -     TSH    14. Urine frequency  -     POCT urinalysis dipstick, automated    15. Flank pain  -     Urinalysis With Microscopic - Urine, Clean Catch    16. Hematuria, unspecified type  -     Urinalysis With Microscopic - Urine, Clean Catch      Follow Up:  Return in about 1 week (around 8/6/2020)  for Recheck.     An After Visit Summary and PPPS were given to the patient.           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 MRI negative aneurysm,  MRA two small aneurysm.  follow up 6 mo  valvular heart disease. Obtain records.   hyperlipidemia unable to take Lipitor, caused blister in mouth. continue good diet  chronic intersitial cystitis, follow up with Dr. Ramirez, s/p DMSO better.  LBP mild and watch, refill meds CT scan NSD  refuse colonscopy, mamm, again 9/21/17  flu shot patient refuses  eso ca follow up with onc,   vitd low continue vitD3 1000iu daily  pneumovax done, prevnar ?done.  refuse shingle shot and Td

## 2020-07-31 ENCOUNTER — HOSPITAL ENCOUNTER (OUTPATIENT)
Dept: GENERAL RADIOLOGY | Facility: HOSPITAL | Age: 85
Discharge: HOME | End: 2020-07-31
Admitting: INTERNAL MEDICINE

## 2020-07-31 ENCOUNTER — TELEPHONE (OUTPATIENT)
Dept: INTERNAL MEDICINE | Facility: CLINIC | Age: 85
End: 2020-07-31

## 2020-07-31 LAB
25(OH)D3+25(OH)D2 SERPL-MCNC: 26.8 NG/ML (ref 30–100)
APPEARANCE UR: ABNORMAL
BACTERIA #/AREA URNS HPF: ABNORMAL /HPF
BILIRUB UR QL STRIP: NEGATIVE
BUN SERPL-MCNC: 21 MG/DL (ref 8–23)
BUN/CREAT SERPL: 23.1 (ref 7–25)
CALCIUM SERPL-MCNC: 9.8 MG/DL (ref 8.6–10.5)
CASTS URNS MICRO: ABNORMAL
CHLORIDE SERPL-SCNC: 103 MMOL/L (ref 98–107)
CHOLEST SERPL-MCNC: 187 MG/DL (ref 0–200)
CO2 SERPL-SCNC: 25.8 MMOL/L (ref 22–29)
COLOR UR: YELLOW
CREAT SERPL-MCNC: 0.91 MG/DL (ref 0.57–1)
EPI CELLS #/AREA URNS HPF: ABNORMAL /HPF
GLUCOSE SERPL-MCNC: 111 MG/DL (ref 65–99)
GLUCOSE UR QL: NEGATIVE
HBA1C MFR BLD: 5.7 % (ref 4.8–5.6)
HDLC SERPL-MCNC: 96 MG/DL (ref 40–60)
HGB UR QL STRIP: ABNORMAL
KETONES UR QL STRIP: NEGATIVE
LDLC SERPL CALC-MCNC: 81 MG/DL (ref 0–100)
LEUKOCYTE ESTERASE UR QL STRIP: ABNORMAL
NITRITE UR QL STRIP: NEGATIVE
PH UR STRIP: 5.5 [PH] (ref 5–8)
POTASSIUM SERPL-SCNC: 4.2 MMOL/L (ref 3.5–5.2)
PROT UR QL STRIP: NEGATIVE
RBC #/AREA URNS HPF: ABNORMAL /HPF
SODIUM SERPL-SCNC: 139 MMOL/L (ref 136–145)
SP GR UR: 1.02 (ref 1–1.03)
TRIGL SERPL-MCNC: 49 MG/DL (ref 0–150)
TSH SERPL DL<=0.005 MIU/L-ACNC: 1.6 UIU/ML (ref 0.27–4.2)
UROBILINOGEN UR STRIP-MCNC: ABNORMAL MG/DL
VIT B12 SERPL-MCNC: 285 PG/ML (ref 211–946)
VLDLC SERPL CALC-MCNC: 9.8 MG/DL
WBC #/AREA URNS HPF: ABNORMAL /HPF

## 2020-07-31 PROCEDURE — 74018 RADEX ABDOMEN 1 VIEW: CPT

## 2020-07-31 RX ORDER — CEPHALEXIN 500 MG/1
500 CAPSULE ORAL 3 TIMES DAILY
Qty: 21 CAPSULE | Refills: 0 | Status: SHIPPED | OUTPATIENT
Start: 2020-07-31 | End: 2020-10-20

## 2020-07-31 NOTE — TELEPHONE ENCOUNTER
FYI, I called the patient and told patient that urine microscopic test is not back yet.  Urine dip test showed only red blood cells.  KUB showed large stool without stone.  Patient states pain is coming back again right flank radiating to right groin area.  I will empirically treat patient with antibiotics.  Patient knows to call if pain is no better Monday.  All go to emergency room if she is worse.

## 2020-07-31 NOTE — TELEPHONE ENCOUNTER
PT STATES THAT SHE WAS TOLD THAT SHE HAD A URINE INFECTION.    PT WOULD LIKE TO KNOW IF ANY MEDICATION WILL BE SENT TO PHARMACY.    PLEASE ADVISE  [phone]

## 2020-08-06 ENCOUNTER — OFFICE VISIT (OUTPATIENT)
Dept: INTERNAL MEDICINE | Facility: CLINIC | Age: 85
End: 2020-08-06

## 2020-08-06 VITALS
RESPIRATION RATE: 16 BRPM | HEART RATE: 70 BPM | DIASTOLIC BLOOD PRESSURE: 82 MMHG | SYSTOLIC BLOOD PRESSURE: 124 MMHG | OXYGEN SATURATION: 97 % | WEIGHT: 119 LBS | HEIGHT: 63 IN | TEMPERATURE: 97.8 F | BODY MASS INDEX: 21.09 KG/M2

## 2020-08-06 DIAGNOSIS — E78.5 HYPERLIPIDEMIA, UNSPECIFIED HYPERLIPIDEMIA TYPE: Primary | ICD-10-CM

## 2020-08-06 DIAGNOSIS — K21.9 GASTROESOPHAGEAL REFLUX DISEASE WITHOUT ESOPHAGITIS: ICD-10-CM

## 2020-08-06 DIAGNOSIS — I10 ESSENTIAL HYPERTENSION: ICD-10-CM

## 2020-08-06 DIAGNOSIS — E55.9 VITAMIN D DEFICIENCY: ICD-10-CM

## 2020-08-06 DIAGNOSIS — N39.0 URINARY TRACT INFECTION WITHOUT HEMATURIA, SITE UNSPECIFIED: ICD-10-CM

## 2020-08-06 DIAGNOSIS — R73.9 HYPERGLYCEMIA: ICD-10-CM

## 2020-08-06 PROCEDURE — 99214 OFFICE O/P EST MOD 30 MIN: CPT | Performed by: INTERNAL MEDICINE

## 2020-08-06 NOTE — PROGRESS NOTES
Subjective   Alberta Suarez is a 85 y.o. female.     Chief Complaint   Patient presents with   • Labs Only       History of Present Illness   Patient here for follow-up.  Urine analysis showed a bladder infection antibiotics that helped.  Sugar still mildly elevated.  Vitamin D slightly low.  B12 low normal.  Right lower back pain x-ray showed no kidney stone.  Pain is worse when standing walking for long time.  Certain position make it worse.  Blood pressure is normal weight is stable    Current Outpatient Medications:   •  alendronate (FOSAMAX) 70 MG tablet, Take 1 Tablet by mouth once a week, Disp: 14 tablet, Rfl: 0  •  ALPRAZolam (XANAX) 0.25 MG tablet, Take 0.25 mg by mouth 2 (Two) Times a Day., Disp: , Rfl: 0  •  calcium-vitamin D (OSCAL 500/200 D-3) 500-200 MG-UNIT per tablet, Take 1 tablet by mouth Daily., Disp: 30 tablet, Rfl: 0  •  cephalexin (Keflex) 500 MG capsule, Take 1 capsule by mouth 3 (Three) Times a Day., Disp: 21 capsule, Rfl: 0  •  lisinopril (PRINIVIL,ZESTRIL) 10 MG tablet, TAKE 1 TABLET BY MOUTH DAILY., Disp: 30 tablet, Rfl: 5  •  omeprazole (priLOSEC) 40 MG capsule, Take 1 capsule by mouth 2 (Two) Times a Day., Disp: 60 capsule, Rfl: 3  •  ondansetron (ZOFRAN) 4 MG tablet, Take 1 tablet by mouth Every 8 (Eight) Hours As Needed for Nausea or Vomiting., Disp: 20 tablet, Rfl: 0  •  oxyCODONE (ROXICODONE) 5 MG immediate release tablet, Take 1 tablet by mouth Every 8 (Eight) Hours As Needed for Moderate Pain ., Disp: 30 tablet, Rfl: 0    The following portions of the patient's history were reviewed and updated as appropriate: allergies, current medications, past family history, past medical history, past social history, past surgical history and problem list.    Review of Systems   Constitutional: Negative.    Respiratory: Negative.    Cardiovascular: Negative.    Gastrointestinal: Negative.    Musculoskeletal: Positive for back pain.   Skin: Negative.    Neurological: Negative.     Psychiatric/Behavioral: Negative.        Objective   Physical Exam   Constitutional: She is oriented to person, place, and time. She appears well-developed and well-nourished.   Neck: Neck supple.   Cardiovascular: Normal rate, regular rhythm and normal heart sounds.   Pulmonary/Chest: Effort normal and breath sounds normal.   Abdominal: Bowel sounds are normal.   Neurological: She is alert and oriented to person, place, and time.   Skin: Skin is warm.   Psychiatric: She has a normal mood and affect.       All tests have been reviewed.    Assessment/Plan   Diagnoses and all orders for this visit:    Hyperlipidemia, unspecified hyperlipidemia type continue good diet    Essential hypertension continue medication    Gastroesophageal reflux disease without esophagitis continue medication    Urinary tract infection without hematuria, site unspecified continue medication    Vitamin D deficiency continue supplement    Vitamin B12 low normal initiate 1 mg daily    Right lower back pain probably muscular origin continue muscle relaxant as needed Tylenol as needed    Hyperglycemia good diet    6 months follow-up      ----Below is to historical records for reference only:      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 MRI negative aneurysm,  MRA two small aneurysm.  follow up 6 mo  valvular heart disease. Obtain records.   hyperlipidemia unable to take Lipitor, caused blister in mouth. continue good diet  chronic intersitial cystitis, follow up with Dr. Ramirez, s/p DMSO better.  LBP mild and watch, refill meds CT scan NSD  refuse colonscopy, mamm, again 9/21/17  flu shot patient refuses  eso ca follow up with onc,   vitd low continue vitD3 1000iu daily  pneumovax done, prevnar ?done.  refuse shingle shot and Td

## 2020-09-01 NOTE — TELEPHONE ENCOUNTER
Caller: Alberta Suarez AB    Relationship: Self    Best call back number: [phone]    Medication needed:   Requested Prescriptions     Pending Prescriptions Disp Refills   • oxyCODONE (ROXICODONE) 5 MG immediate release tablet 30 tablet 0     Sig: Take 1 tablet by mouth Every 8 (Eight) Hours As Needed for Moderate Pain .       When do you need the refill by: ASAP    What details did the patient provide when requesting the medication: OUT OF MEDICATION    Does the patient have less than a 3 day supply:  [x] Yes  [] No    What is the patient's preferred pharmacy: [facility] [place] - [facility] [address] - [phone]  - [phone] FX

## 2020-09-02 RX ORDER — OXYCODONE HYDROCHLORIDE 5 MG/1
5 TABLET ORAL EVERY 8 HOURS PRN
Qty: 30 TABLET | Refills: 0 | Status: SHIPPED | OUTPATIENT
Start: 2020-09-02 | End: 2020-10-30 | Stop reason: SDUPTHER

## 2020-10-20 ENCOUNTER — OFFICE VISIT (OUTPATIENT)
Dept: INTERNAL MEDICINE | Facility: CLINIC | Age: 85
End: 2020-10-20

## 2020-10-20 VITALS
HEART RATE: 82 BPM | SYSTOLIC BLOOD PRESSURE: 132 MMHG | WEIGHT: 115 LBS | OXYGEN SATURATION: 98 % | TEMPERATURE: 98 F | HEIGHT: 63 IN | BODY MASS INDEX: 20.38 KG/M2 | DIASTOLIC BLOOD PRESSURE: 70 MMHG

## 2020-10-20 DIAGNOSIS — M54.16 LUMBAR RADICULOPATHY: ICD-10-CM

## 2020-10-20 DIAGNOSIS — E55.9 VITAMIN D DEFICIENCY, UNSPECIFIED: ICD-10-CM

## 2020-10-20 DIAGNOSIS — Z23 NEED FOR INFLUENZA VACCINATION: Primary | ICD-10-CM

## 2020-10-20 PROCEDURE — 99214 OFFICE O/P EST MOD 30 MIN: CPT | Performed by: INTERNAL MEDICINE

## 2020-10-20 PROCEDURE — G0008 ADMIN INFLUENZA VIRUS VAC: HCPCS | Performed by: INTERNAL MEDICINE

## 2020-10-20 PROCEDURE — 90694 VACC AIIV4 NO PRSRV 0.5ML IM: CPT | Performed by: INTERNAL MEDICINE

## 2020-10-20 NOTE — PROGRESS NOTES
Subjective   Alberta Suarez is a 85 y.o. female.     Chief Complaint   Patient presents with   • Abdominal Pain     pt states that she is having side pain along with some constipation onset 2 years but getting worse everyday         History of Present Illness   Low back pain for 2 years and worse for 3 mos. Pain worse with weight bearing.lying down much better.  Pain radiate to adeola ant abdomen, pain is achy, no radiation to legs, no numbness or tingling. No n/v, some constipation. CBD oild works better than lortab, which patient is taking only occasionally. , burps a lot. Weight stable.    Current Outpatient Medications:   •  calcium-vitamin D (OSCAL 500/200 D-3) 500-200 MG-UNIT per tablet, Take 1 tablet by mouth Daily., Disp: 30 tablet, Rfl: 0  •  lisinopril (PRINIVIL,ZESTRIL) 10 MG tablet, TAKE 1 TABLET BY MOUTH DAILY., Disp: 30 tablet, Rfl: 5  •  omeprazole (priLOSEC) 40 MG capsule, Take 1 capsule by mouth 2 (Two) Times a Day., Disp: 60 capsule, Rfl: 3  •  ondansetron (ZOFRAN) 4 MG tablet, Take 1 tablet by mouth Every 8 (Eight) Hours As Needed for Nausea or Vomiting., Disp: 20 tablet, Rfl: 0  •  oxyCODONE (ROXICODONE) 5 MG immediate release tablet, Take 1 tablet by mouth Every 8 (Eight) Hours As Needed for Moderate Pain ., Disp: 30 tablet, Rfl: 0    The following portions of the patient's history were reviewed and updated as appropriate: allergies, current medications, past family history, past medical history, past social history, past surgical history and problem list.    Review of Systems   Constitutional: Negative.    Respiratory: Negative.    Cardiovascular: Negative.    Musculoskeletal: Positive for back pain.   Skin: Negative.    Neurological: Negative.    Psychiatric/Behavioral: Negative.        Objective   Physical Exam  Neck:      Musculoskeletal: Neck supple.   Cardiovascular:      Rate and Rhythm: Normal rate and regular rhythm.      Heart sounds: Normal heart sounds.   Pulmonary:      Effort:  Pulmonary effort is normal.      Breath sounds: Normal breath sounds.   Abdominal:      General: Bowel sounds are normal.      Tenderness: There is no abdominal tenderness. There is no guarding or rebound.      Hernia: No hernia is present.   Musculoskeletal:         General: No tenderness (in lower back no tender).   Skin:     General: Skin is warm.   Neurological:      Mental Status: She is alert and oriented to person, place, and time.         All tests have been reviewed.    Assessment/Plan   Diagnoses and all orders for this visit:    Need for influenza vaccination  -     Fluad Quad 65+ yrs ([id number])    Lumbar radiculopathy  -     CBC & Differential  -     Comprehensive Metabolic Panel  -     C-reactive Protein  -     Sedimentation Rate  -     Vitamin D 25 Hydroxy  -     Vitamin B12  -     MRI Lumbar Spine Without Contrast    Vitamin D deficiency, unspecified   -     Vitamin D 25 Hydroxy      10 days after mri and labs

## 2020-10-30 RX ORDER — OXYCODONE HYDROCHLORIDE 5 MG/1
5 TABLET ORAL EVERY 8 HOURS PRN
Qty: 30 TABLET | Refills: 0 | Status: SHIPPED | OUTPATIENT
Start: 2020-10-30 | End: 2021-03-17

## 2020-10-30 NOTE — TELEPHONE ENCOUNTER
PATIENT CALLED AND SAID SHE NEEDS REFILL ON HER OXYCODONE AS NEEDED    [facility]    PATIENT WOULD ALSO LIKE A CALL BACK REGARDING MRI    DIAN: [phone]

## 2020-11-02 RX ORDER — OXYCODONE HYDROCHLORIDE 5 MG/1
5 TABLET ORAL EVERY 8 HOURS PRN
Qty: 30 TABLET | Refills: 0 | OUTPATIENT
Start: 2020-11-02

## 2020-11-06 ENCOUNTER — HOSPITAL ENCOUNTER (OUTPATIENT)
Dept: MRI IMAGING | Facility: HOSPITAL | Age: 85
Discharge: HOME | End: 2020-11-06
Admitting: INTERNAL MEDICINE

## 2020-11-06 PROCEDURE — 72148 MRI LUMBAR SPINE W/O DYE: CPT

## 2020-11-09 ENCOUNTER — OFFICE VISIT (OUTPATIENT)
Dept: INTERNAL MEDICINE | Facility: CLINIC | Age: 85
End: 2020-11-09

## 2020-11-09 VITALS
OXYGEN SATURATION: 96 % | WEIGHT: 114.8 LBS | DIASTOLIC BLOOD PRESSURE: 70 MMHG | BODY MASS INDEX: 20.34 KG/M2 | HEART RATE: 77 BPM | TEMPERATURE: 97.7 F | HEIGHT: 63 IN | SYSTOLIC BLOOD PRESSURE: 140 MMHG

## 2020-11-09 DIAGNOSIS — R10.11 RIGHT UPPER QUADRANT ABDOMINAL PAIN: Primary | ICD-10-CM

## 2020-11-09 LAB
25(OH)D3+25(OH)D2 SERPL-MCNC: 18.4 NG/ML (ref 30–100)
ALBUMIN SERPL-MCNC: 4.3 G/DL (ref 3.5–5.2)
ALBUMIN/GLOB SERPL: 1.5 G/DL
ALP SERPL-CCNC: 71 U/L (ref 39–117)
ALT SERPL-CCNC: 11 U/L (ref 1–33)
AST SERPL-CCNC: 15 U/L (ref 1–32)
BASOPHILS # BLD AUTO: 0.05 10*3/MM3 (ref 0–0.2)
BASOPHILS NFR BLD AUTO: 1 % (ref 0–1.5)
BILIRUB SERPL-MCNC: 0.3 MG/DL (ref 0–1.2)
BUN SERPL-MCNC: 18 MG/DL (ref 8–23)
BUN/CREAT SERPL: 19.1 (ref 7–25)
CALCIUM SERPL-MCNC: 9.7 MG/DL (ref 8.6–10.5)
CHLORIDE SERPL-SCNC: 104 MMOL/L (ref 98–107)
CO2 SERPL-SCNC: 25.3 MMOL/L (ref 22–29)
CREAT SERPL-MCNC: 0.94 MG/DL (ref 0.57–1)
CRP SERPL-MCNC: 0.11 MG/DL (ref 0–0.5)
EOSINOPHIL # BLD AUTO: 0.12 10*3/MM3 (ref 0–0.4)
EOSINOPHIL NFR BLD AUTO: 2.4 % (ref 0.3–6.2)
ERYTHROCYTE [DISTWIDTH] IN BLOOD BY AUTOMATED COUNT: 13.2 % (ref 12.3–15.4)
ERYTHROCYTE [SEDIMENTATION RATE] IN BLOOD BY WESTERGREN METHOD: 10 MM/HR (ref 0–30)
GLOBULIN SER CALC-MCNC: 2.8 GM/DL
GLUCOSE SERPL-MCNC: 112 MG/DL (ref 65–99)
HCT VFR BLD AUTO: 39.3 % (ref 34–46.6)
HGB BLD-MCNC: 13 G/DL (ref 12–15.9)
IMM GRANULOCYTES # BLD AUTO: 0.01 10*3/MM3 (ref 0–0.05)
IMM GRANULOCYTES NFR BLD AUTO: 0.2 % (ref 0–0.5)
LYMPHOCYTES # BLD AUTO: 1.8 10*3/MM3 (ref 0.7–3.1)
LYMPHOCYTES NFR BLD AUTO: 36.3 % (ref 19.6–45.3)
MCH RBC QN AUTO: 30.1 PG (ref 26.6–33)
MCHC RBC AUTO-ENTMCNC: 33.1 G/DL (ref 31.5–35.7)
MCV RBC AUTO: 91 FL (ref 79–97)
MONOCYTES # BLD AUTO: 0.58 10*3/MM3 (ref 0.1–0.9)
MONOCYTES NFR BLD AUTO: 11.7 % (ref 5–12)
NEUTROPHILS # BLD AUTO: 2.4 10*3/MM3 (ref 1.7–7)
NEUTROPHILS NFR BLD AUTO: 48.4 % (ref 42.7–76)
NRBC BLD AUTO-RTO: 0 /100 WBC (ref 0–0.2)
PLATELET # BLD AUTO: 274 10*3/MM3 (ref 140–450)
POTASSIUM SERPL-SCNC: 4.8 MMOL/L (ref 3.5–5.2)
PROT SERPL-MCNC: 7.1 G/DL (ref 6–8.5)
RBC # BLD AUTO: 4.32 10*6/MM3 (ref 3.77–5.28)
SODIUM SERPL-SCNC: 139 MMOL/L (ref 136–145)
VIT B12 SERPL-MCNC: 297 PG/ML (ref 211–946)
WBC # BLD AUTO: 4.96 10*3/MM3 (ref 3.4–10.8)

## 2020-11-09 PROCEDURE — 99214 OFFICE O/P EST MOD 30 MIN: CPT | Performed by: INTERNAL MEDICINE

## 2020-11-11 LAB
BACTERIA UR CULT: NORMAL
BACTERIA UR CULT: NORMAL

## 2020-11-19 ENCOUNTER — HOSPITAL ENCOUNTER (OUTPATIENT)
Dept: CT IMAGING | Facility: HOSPITAL | Age: 85
Discharge: HOME | End: 2020-11-19
Admitting: INTERNAL MEDICINE

## 2020-11-19 PROCEDURE — 74178 CT ABD&PLV WO CNTR FLWD CNTR: CPT

## 2020-11-19 PROCEDURE — 25010000002 IOPAMIDOL 61 % SOLUTION: Performed by: INTERNAL MEDICINE

## 2020-11-19 RX ADMIN — IOPAMIDOL 100 ML: 612 INJECTION, SOLUTION INTRAVENOUS at 14:38

## 2020-12-03 ENCOUNTER — TELEPHONE (OUTPATIENT)
Dept: INTERNAL MEDICINE | Facility: CLINIC | Age: 85
End: 2020-12-03

## 2020-12-03 NOTE — TELEPHONE ENCOUNTER
Caller: Alberta Suarez    Relationship: Self    Best call back number: [phone]     Caller requesting test results: CT SCAN    What test was performed: CT SCAN    When was the test performed: 11/19/2020    Where was the test performed: [facility]    Additional notes: NA

## 2020-12-11 RX ORDER — AMLODIPINE BESYLATE 5 MG/1
TABLET ORAL
Qty: 90 TABLET | Refills: 3 | Status: SHIPPED | OUTPATIENT
Start: 2020-12-11

## 2021-02-08 RX ORDER — OXYCODONE HYDROCHLORIDE 5 MG/1
5 TABLET ORAL EVERY 8 HOURS PRN
Qty: 30 TABLET | Refills: 0 | OUTPATIENT
Start: 2021-02-08

## 2021-02-08 RX ORDER — ONDANSETRON 4 MG/1
TABLET, ORALLY DISINTEGRATING ORAL
Qty: 20 TABLET | Refills: 0 | OUTPATIENT
Start: 2021-02-08

## 2021-02-08 RX ORDER — OMEPRAZOLE 40 MG/1
40 CAPSULE, DELAYED RELEASE ORAL 2 TIMES DAILY
Qty: 60 CAPSULE | Refills: 2 | OUTPATIENT
Start: 2021-02-08

## 2021-02-08 RX ORDER — ONDANSETRON 4 MG/1
4 TABLET, FILM COATED ORAL EVERY 8 HOURS PRN
Qty: 20 TABLET | Refills: 0 | Status: CANCELLED | OUTPATIENT
Start: 2021-02-08

## 2021-02-08 RX ORDER — OXYCODONE HYDROCHLORIDE 5 MG/1
5 TABLET ORAL EVERY 8 HOURS PRN
Qty: 30 TABLET | Refills: 0 | Status: CANCELLED | OUTPATIENT
Start: 2021-02-08

## 2021-02-08 NOTE — TELEPHONE ENCOUNTER
Caller: Alberta Suarez    Relationship: Self    Best call back number: [phone]     Medication needed:   Requested Prescriptions     Pending Prescriptions Disp Refills   • oxyCODONE (ROXICODONE) 5 MG immediate release tablet 30 tablet 0     Sig: Take 1 tablet by mouth Every 8 (Eight) Hours As Needed for Moderate Pain .   • ondansetron (ZOFRAN) 4 MG tablet 20 tablet 0     Sig: Take 1 tablet by mouth Every 8 (Eight) Hours As Needed for Nausea or Vomiting.       When do you need the refill by: 02/08    What details did the patient provide when requesting the medication: PATIENT IS OUT OF THE MEDICATION     Does the patient have less than a 3 day supply:  [x] Yes  [] No    What is the patient's preferred pharmacy: [facility] - [address] - [phone]  - [phone] FX

## 2021-02-08 NOTE — TELEPHONE ENCOUNTER
PATIENT STATES SHE IS HAVING SEVERE BACK PAIN AND HAD TO CANCEL HER APPOINTMENT AND SHE DOESN'T KNOW WHEN SHE CAN COME IN     SHE IS WILLING TO DO A TELEHEALTH VISIT    PATIENT CALL BACK [phone]   PLEASE CALL

## 2021-02-08 NOTE — TELEPHONE ENCOUNTER
Last office visit 11/09/2020  No future appointment scheduled.     No controlled substance agreement on file  No UDS in chart

## 2021-02-18 ENCOUNTER — PATIENT OUTREACH (OUTPATIENT)
Dept: PHARMACY | Facility: HOSPITAL | Age: 86
End: 2021-02-18

## 2021-02-18 NOTE — OUTREACH NOTE
Medication Adherence Call    Patient was called today to discuss medication adherence with lisinopril, as the patient was identified as having care opportunities.     The patient denies issues with adherence and denies any barriers to receiving medications. Patient aware of 90 day supply option, but would rather keep her 30 day supply.    Jillian Quesada, PharmD  02/18/21

## 2021-02-23 RX ORDER — OMEPRAZOLE 40 MG/1
40 CAPSULE, DELAYED RELEASE ORAL 2 TIMES DAILY
Qty: 180 CAPSULE | Refills: 3 | OUTPATIENT
Start: 2021-02-23 | End: 2021-08-30

## 2021-03-17 ENCOUNTER — OFFICE VISIT (OUTPATIENT)
Dept: INTERNAL MEDICINE | Facility: CLINIC | Age: 86
End: 2021-03-17

## 2021-03-17 VITALS
WEIGHT: 116 LBS | BODY MASS INDEX: 20.55 KG/M2 | HEART RATE: 85 BPM | SYSTOLIC BLOOD PRESSURE: 152 MMHG | OXYGEN SATURATION: 95 % | HEIGHT: 63 IN | DIASTOLIC BLOOD PRESSURE: 100 MMHG | TEMPERATURE: 97.5 F

## 2021-03-17 DIAGNOSIS — M54.16 LUMBAR RADICULOPATHY: Primary | ICD-10-CM

## 2021-03-17 DIAGNOSIS — I10 ESSENTIAL HYPERTENSION: ICD-10-CM

## 2021-03-17 PROCEDURE — 99214 OFFICE O/P EST MOD 30 MIN: CPT | Performed by: INTERNAL MEDICINE

## 2021-03-17 RX ORDER — ONDANSETRON 4 MG/1
4 TABLET, FILM COATED ORAL EVERY 8 HOURS PRN
Qty: 20 TABLET | Refills: 0 | OUTPATIENT
Start: 2021-03-17 | End: 2021-08-30

## 2021-03-17 RX ORDER — TRAMADOL HYDROCHLORIDE 50 MG/1
50 TABLET ORAL EVERY 6 HOURS PRN
COMMUNITY
End: 2021-03-17 | Stop reason: SDUPTHER

## 2021-03-17 RX ORDER — TRAMADOL HYDROCHLORIDE 50 MG/1
50 TABLET ORAL 2 TIMES DAILY PRN
Qty: 40 TABLET | Refills: 1 | Status: SHIPPED | OUTPATIENT
Start: 2021-03-17 | End: 2021-06-30

## 2021-03-17 RX ORDER — BACLOFEN 10 MG/1
10 TABLET ORAL 2 TIMES DAILY PRN
Qty: 40 TABLET | Refills: 1 | Status: SHIPPED | OUTPATIENT
Start: 2021-03-17 | End: 2021-05-26

## 2021-03-17 NOTE — PROGRESS NOTES
Subjective   Alberta Suarez is a 85 y.o. female.     Chief Complaint   Patient presents with   • Back Pain     follow up and refill medication        History of Present Illness   Patient follow-up with back pain patient states right low back pain radiated to right side of abdomen.  No numbness tingling in lower extremities.  Occasional shooting pain to the leg on the right side.  Pain is achy.  No chest pain short of breath.  No nausea vomiting diarrhea constipation.  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  CT of abdomen unremarkable.  Blood pressure elevated today.  Patient skipped her blood pressure medicine today.    Current Outpatient Medications:   •  amLODIPine (NORVASC) 5 MG tablet, TAKE 1 TABLET (5MG) BY MOUTH EVERY DAY, Disp: 90 tablet, Rfl: 3  •  calcium-vitamin D (OSCAL 500/200 D-3) 500-200 MG-UNIT per tablet, Take 1 tablet by mouth Daily., Disp: 30 tablet, Rfl: 0  •  lisinopril (PRINIVIL,ZESTRIL) 10 MG tablet, TAKE 1 TABLET BY MOUTH DAILY., Disp: 30 tablet, Rfl: 5  •  omeprazole (priLOSEC) 40 MG capsule, TAKE 1 CAPSULE BY MOUTH 2 (TWO) TIMES A DAY., Disp: 180 capsule, Rfl: 3  •  ondansetron (ZOFRAN) 4 MG tablet, Take 1 tablet by mouth Every 8 (Eight) Hours As Needed for Nausea or Vomiting., Disp: 20 tablet, Rfl: 0  •  traMADol (ULTRAM) 50 MG tablet, Take 1 tablet by mouth 2 (Two) Times a Day As Needed for Moderate Pain  or Severe Pain ., Disp: 40 tablet, Rfl: 1  •  baclofen (LIORESAL) 10 MG tablet, Take 1 tablet by mouth 2 (Two) Times a Day As Needed for Muscle Spasms., Disp: 40 tablet, Rfl: 1    The following portions of the patient's history were reviewed and updated as appropriate: allergies, current medications, past family history, past medical history, past social history, past surgical history and problem list.    Review of Systems   Constitutional: Negative.    Respiratory: Negative.    Cardiovascular: Negative.    Gastrointestinal: Negative.    Musculoskeletal: Positive for back pain.    Skin: Negative.    Neurological: Negative.    Psychiatric/Behavioral: Negative.        Objective   Physical Exam  Cardiovascular:      Rate and Rhythm: Normal rate and regular rhythm.      Heart sounds: Normal heart sounds.   Pulmonary:      Effort: Pulmonary effort is normal.      Breath sounds: Normal breath sounds.   Abdominal:      General: Bowel sounds are normal.   Musculoskeletal:         General: Tenderness present.      Cervical back: Neck supple.   Skin:     General: Skin is warm.   Neurological:      Mental Status: She is alert and oriented to person, place, and time.         All tests have been reviewed.    Assessment/Plan   Diagnoses and all orders for this visit:    Lumbar radiculopathy  -     Ambulatory Referral to Pain Management  -     baclofen (LIORESAL) 10 MG tablet; Take 1 tablet by mouth 2 (Two) Times a Day As Needed for Muscle Spasms.  -     traMADol (ULTRAM) 50 MG tablet; Take 1 tablet by mouth 2 (Two) Times a Day As Needed for Moderate Pain  or Severe Pain .  -     ondansetron (ZOFRAN) 4 MG tablet; Take 1 tablet by mouth Every 8 (Eight) Hours As Needed for Nausea or Vomiting.    Essential hypertension compliance with medication emphasized    Other orders  -     Discontinue: traMADol (ULTRAM) 50 MG tablet; Take 50 mg by mouth Every 6 (Six) Hours As Needed for Moderate Pain  or Severe Pain .        1mo with others

## 2021-04-19 ENCOUNTER — OFFICE VISIT (OUTPATIENT)
Dept: INTERNAL MEDICINE | Facility: CLINIC | Age: 86
End: 2021-04-19

## 2021-04-19 VITALS
BODY MASS INDEX: 21.44 KG/M2 | WEIGHT: 121 LBS | OXYGEN SATURATION: 98 % | SYSTOLIC BLOOD PRESSURE: 142 MMHG | TEMPERATURE: 96.4 F | HEIGHT: 63 IN | DIASTOLIC BLOOD PRESSURE: 90 MMHG | HEART RATE: 78 BPM

## 2021-04-19 DIAGNOSIS — M54.16 LUMBAR RADICULOPATHY: ICD-10-CM

## 2021-04-19 DIAGNOSIS — I10 ESSENTIAL HYPERTENSION: Primary | ICD-10-CM

## 2021-04-19 PROCEDURE — 99213 OFFICE O/P EST LOW 20 MIN: CPT | Performed by: INTERNAL MEDICINE

## 2021-04-19 RX ORDER — LISINOPRIL 20 MG/1
20 TABLET ORAL DAILY
Qty: 30 TABLET | Refills: 1 | Status: SHIPPED | OUTPATIENT
Start: 2021-04-19 | End: 2021-05-26 | Stop reason: SDUPTHER

## 2021-04-19 NOTE — PROGRESS NOTES
Subjective   Alberta Suarez is a 85 y.o. female.     Chief Complaint   Patient presents with   • Follow-up   • Hypertension       History of Present Illness   Patient here for follow-up.  The blood pressure slightly elevated on medication.  Lumbar radiculopathy pain resolved after medication.    Current Outpatient Medications:   •  amLODIPine (NORVASC) 5 MG tablet, TAKE 1 TABLET (5MG) BY MOUTH EVERY DAY, Disp: 90 tablet, Rfl: 3  •  calcium-vitamin D (OSCAL 500/200 D-3) 500-200 MG-UNIT per tablet, Take 1 tablet by mouth Daily., Disp: 30 tablet, Rfl: 0  •  lisinopril (PRINIVIL,ZESTRIL) 20 MG tablet, Take 1 tablet by mouth Daily., Disp: 30 tablet, Rfl: 1  •  omeprazole (priLOSEC) 40 MG capsule, TAKE 1 CAPSULE BY MOUTH 2 (TWO) TIMES A DAY., Disp: 180 capsule, Rfl: 3  •  ondansetron (ZOFRAN) 4 MG tablet, Take 1 tablet by mouth Every 8 (Eight) Hours As Needed for Nausea or Vomiting., Disp: 20 tablet, Rfl: 0  •  traMADol (ULTRAM) 50 MG tablet, Take 1 tablet by mouth 2 (Two) Times a Day As Needed for Moderate Pain  or Severe Pain ., Disp: 40 tablet, Rfl: 1  •  baclofen (LIORESAL) 10 MG tablet, Take 1 tablet by mouth 2 (Two) Times a Day As Needed for Muscle Spasms., Disp: 40 tablet, Rfl: 1    The following portions of the patient's history were reviewed and updated as appropriate: allergies, current medications, past family history, past medical history, past social history, past surgical history and problem list.    Review of Systems   Constitutional: Negative.    Respiratory: Negative.    Cardiovascular: Negative.         Bp high   Gastrointestinal: Negative.    Musculoskeletal: Negative for back pain.   Skin: Negative.    Psychiatric/Behavioral: Negative.        Objective   Physical Exam  Constitutional:       Appearance: She is well-developed.   Cardiovascular:      Rate and Rhythm: Normal rate and regular rhythm.      Heart sounds: Normal heart sounds.   Pulmonary:      Effort: Pulmonary effort is normal.      Breath  sounds: Normal breath sounds.   Abdominal:      General: Bowel sounds are normal.      Palpations: Abdomen is soft.   Musculoskeletal:         General: No tenderness.      Cervical back: Neck supple.   Neurological:      Mental Status: She is alert and oriented to person, place, and time.   Psychiatric:         Behavior: Behavior normal.         All tests have been reviewed.    Assessment/Plan   Diagnoses and all orders for this visit:    Essential hypertension  -     lisinopril (PRINIVIL,ZESTRIL) 20 MG tablet; Take 1 tablet by mouth Daily.  Increase the lisinopril to 20 mg daily if blood pressure is too low patient knows to call.    Lumbar radiculopathy resolved we will cancel pain clinic    Follow-up in 1 months with others

## 2021-05-14 ENCOUNTER — TELEPHONE (OUTPATIENT)
Dept: INTERNAL MEDICINE | Facility: CLINIC | Age: 86
End: 2021-05-14

## 2021-05-14 NOTE — TELEPHONE ENCOUNTER
Caller: Alberta Suarez    Relationship: Self    Best call back number: [phone]     What medication are you requesting: METHOCARBAMOL    If a prescription is needed, what is your preferred pharmacy and phone number:  [facility] - [place] - [address] - [phone]  - [phone] FX        Additional notes: PATIENT STATED THAT IS NEEDS TO BE CALLED IN TODAY IN ORDER FOR HER TO GET IT.

## 2021-05-14 NOTE — TELEPHONE ENCOUNTER
I spoke with Alberta she says she has not been taking baclofen because it breaks her out.     She did good on robaxin before.,    Please advise on med change

## 2021-05-16 RX ORDER — METHOCARBAMOL 500 MG/1
500 TABLET, FILM COATED ORAL 3 TIMES DAILY
Qty: 40 TABLET | Refills: 0 | Status: SHIPPED | OUTPATIENT
Start: 2021-05-16 | End: 2021-06-30

## 2021-05-26 ENCOUNTER — OFFICE VISIT (OUTPATIENT)
Dept: INTERNAL MEDICINE | Facility: CLINIC | Age: 86
End: 2021-05-26

## 2021-05-26 VITALS
HEART RATE: 79 BPM | OXYGEN SATURATION: 98 % | BODY MASS INDEX: 20.73 KG/M2 | WEIGHT: 117 LBS | HEIGHT: 63 IN | TEMPERATURE: 97.5 F | DIASTOLIC BLOOD PRESSURE: 90 MMHG | SYSTOLIC BLOOD PRESSURE: 140 MMHG

## 2021-05-26 DIAGNOSIS — K59.09 OTHER CONSTIPATION: ICD-10-CM

## 2021-05-26 DIAGNOSIS — K57.90 DIVERTICULOSIS: ICD-10-CM

## 2021-05-26 DIAGNOSIS — E55.9 VITAMIN D DEFICIENCY: ICD-10-CM

## 2021-05-26 DIAGNOSIS — I10 ESSENTIAL HYPERTENSION: Primary | ICD-10-CM

## 2021-05-26 DIAGNOSIS — K21.9 GASTROESOPHAGEAL REFLUX DISEASE WITHOUT ESOPHAGITIS: ICD-10-CM

## 2021-05-26 DIAGNOSIS — I67.1 CEREBRAL ANEURYSM: ICD-10-CM

## 2021-05-26 DIAGNOSIS — E53.8 B12 DEFICIENCY: ICD-10-CM

## 2021-05-26 DIAGNOSIS — M54.16 LUMBAR RADICULOPATHY: ICD-10-CM

## 2021-05-26 DIAGNOSIS — R73.9 HYPERGLYCEMIA: ICD-10-CM

## 2021-05-26 DIAGNOSIS — C15.9 MALIGNANT NEOPLASM OF ESOPHAGUS, UNSPECIFIED LOCATION (HCC): ICD-10-CM

## 2021-05-26 DIAGNOSIS — E78.5 HYPERLIPIDEMIA, UNSPECIFIED HYPERLIPIDEMIA TYPE: ICD-10-CM

## 2021-05-26 PROBLEM — K22.2 ESOPHAGEAL STRICTURE: Status: RESOLVED | Noted: 2020-06-22 | Resolved: 2021-05-26

## 2021-05-26 PROCEDURE — 99214 OFFICE O/P EST MOD 30 MIN: CPT | Performed by: INTERNAL MEDICINE

## 2021-05-26 RX ORDER — LISINOPRIL 30 MG/1
30 TABLET ORAL DAILY
Qty: 30 TABLET | Refills: 1 | Status: SHIPPED | OUTPATIENT
Start: 2021-05-26 | End: 2021-06-30 | Stop reason: SDUPTHER

## 2021-05-26 NOTE — PROGRESS NOTES
Subjective   Alberta Suarez is a 85 y.o. female.     Chief Complaint   Patient presents with   • Follow-up   • Hypertension       History of Present Illness   Patient here for follow-up.  Blood pressure 140/90.  History of a cerebral aneurysm.  Vitamin D deficiency in the past B12 also on the low side.  Lumbar radiculopathy stable now.  CT scan showed a diverticulosis and up stable constipation.  No significant abdominal pain now.  Cholesterol stable.  GERD stable on medication.  Esophageal cancer in the past patient still follow-up with oncologist.  Sugar slightly elevated.    Current Outpatient Medications:   •  amLODIPine (NORVASC) 5 MG tablet, TAKE 1 TABLET (5MG) BY MOUTH EVERY DAY, Disp: 90 tablet, Rfl: 3  •  calcium-vitamin D (OSCAL 500/200 D-3) 500-200 MG-UNIT per tablet, Take 1 tablet by mouth Daily., Disp: 30 tablet, Rfl: 0  •  lisinopril (PRINIVIL,ZESTRIL) 30 MG tablet, Take 1 tablet by mouth Daily., Disp: 30 tablet, Rfl: 1  •  methocarbamol (Robaxin) 500 MG tablet, Take 1 tablet by mouth 3 (Three) Times a Day., Disp: 40 tablet, Rfl: 0  •  omeprazole (priLOSEC) 40 MG capsule, TAKE 1 CAPSULE BY MOUTH 2 (TWO) TIMES A DAY., Disp: 180 capsule, Rfl: 3  •  ondansetron (ZOFRAN) 4 MG tablet, Take 1 tablet by mouth Every 8 (Eight) Hours As Needed for Nausea or Vomiting., Disp: 20 tablet, Rfl: 0  •  traMADol (ULTRAM) 50 MG tablet, Take 1 tablet by mouth 2 (Two) Times a Day As Needed for Moderate Pain  or Severe Pain ., Disp: 40 tablet, Rfl: 1    The following portions of the patient's history were reviewed and updated as appropriate: allergies, current medications, past family history, past medical history, past social history, past surgical history and problem list.    Review of Systems   Constitutional: Negative.    Respiratory: Negative.    Cardiovascular: Negative.    Gastrointestinal: Negative.    Musculoskeletal: Negative.    Skin: Negative.    Neurological: Negative.    Psychiatric/Behavioral: Negative.         Objective   Physical Exam  Cardiovascular:      Rate and Rhythm: Normal rate and regular rhythm.      Heart sounds: Normal heart sounds.   Pulmonary:      Effort: Pulmonary effort is normal.      Breath sounds: Normal breath sounds.   Abdominal:      General: Bowel sounds are normal.   Musculoskeletal:      Cervical back: Neck supple.   Skin:     General: Skin is warm.   Neurological:      Mental Status: She is alert and oriented to person, place, and time.         All tests have been reviewed.    Assessment/Plan   Diagnoses and all orders for this visit:    Essential hypertension increase lisinopril to 30 mg daily  -     CBC & Differential  -     Comprehensive Metabolic Panel  -     lisinopril (PRINIVIL,ZESTRIL) 30 MG tablet; Take 1 tablet by mouth Daily.    Hyperlipidemia, patient is not interested in taking cholesterol medicine    Lumbar radiculopathy stable now    Vitamin D deficiency check lab  -     Vitamin D 25 Hydroxy    Gastroesophageal reflux disease without esophagitis continue medicine    Cerebral aneurysm follow-up MRA  -     MRI angiogram head w contrast; Future    Malignant neoplasm of esophagus, unspecified location (CMS/HCC) follow-up with oncology    Hyperglycemia check lab  -     Hemoglobin A1c    B12 deficiency check lab  -     Vitamin B12    Diverticulosis watch    Other constipation increase the fiber in the water intake    1 month follow-up          ----Below is to historical records for reference only:      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 MRI negative aneurysm,  MRA two small aneurysm.   valvular heart disease. Obtain records.   hyperlipidemia unable to take Lipitor, caused blister in mouth. continue good diet  chronic intersitial cystitis, follow up with Dr. Ramirez, s/p DMSO better.  LBP mild and watch, refill meds CT scan NSD  refuse colonscopy, mamm, again  9/21/17  flu shot patient refuses  eso ca follow up with onc,   vitd low continue vitD3 1000iu daily  pneumovax done, prevnar ?done.  refuse shingle shot and Td

## 2021-06-25 ENCOUNTER — HOSPITAL ENCOUNTER (OUTPATIENT)
Dept: MRI IMAGING | Facility: HOSPITAL | Age: 86
Discharge: HOME | End: 2021-06-25
Admitting: INTERNAL MEDICINE

## 2021-06-25 DIAGNOSIS — I67.1 CEREBRAL ANEURYSM: ICD-10-CM

## 2021-06-25 PROCEDURE — 70544 MR ANGIOGRAPHY HEAD W/O DYE: CPT

## 2021-06-29 LAB
25(OH)D3+25(OH)D2 SERPL-MCNC: 29.2 NG/ML (ref 30–100)
ALBUMIN SERPL-MCNC: 4.7 G/DL (ref 3.5–5.2)
ALBUMIN/GLOB SERPL: 2.1 G/DL
ALP SERPL-CCNC: 63 U/L (ref 39–117)
ALT SERPL-CCNC: 8 U/L (ref 1–33)
AST SERPL-CCNC: 16 U/L (ref 1–32)
BASOPHILS # BLD AUTO: 0.05 10*3/MM3 (ref 0–0.2)
BASOPHILS NFR BLD AUTO: 0.8 % (ref 0–1.5)
BILIRUB SERPL-MCNC: 0.3 MG/DL (ref 0–1.2)
BUN SERPL-MCNC: 11 MG/DL (ref 8–23)
BUN/CREAT SERPL: 12.8 (ref 7–25)
CALCIUM SERPL-MCNC: 9.6 MG/DL (ref 8.6–10.5)
CHLORIDE SERPL-SCNC: 107 MMOL/L (ref 98–107)
CO2 SERPL-SCNC: 25.7 MMOL/L (ref 22–29)
CREAT SERPL-MCNC: 0.86 MG/DL (ref 0.57–1)
EOSINOPHIL # BLD AUTO: 0.27 10*3/MM3 (ref 0–0.4)
EOSINOPHIL NFR BLD AUTO: 4.3 % (ref 0.3–6.2)
ERYTHROCYTE [DISTWIDTH] IN BLOOD BY AUTOMATED COUNT: 13.4 % (ref 12.3–15.4)
GLOBULIN SER CALC-MCNC: 2.2 GM/DL
GLUCOSE SERPL-MCNC: 100 MG/DL (ref 65–99)
HBA1C MFR BLD: 5.7 % (ref 4.8–5.6)
HCT VFR BLD AUTO: 37.5 % (ref 34–46.6)
HGB BLD-MCNC: 12.1 G/DL (ref 12–15.9)
IMM GRANULOCYTES # BLD AUTO: 0.01 10*3/MM3 (ref 0–0.05)
IMM GRANULOCYTES NFR BLD AUTO: 0.2 % (ref 0–0.5)
LYMPHOCYTES # BLD AUTO: 1.43 10*3/MM3 (ref 0.7–3.1)
LYMPHOCYTES NFR BLD AUTO: 22.5 % (ref 19.6–45.3)
MCH RBC QN AUTO: 30.6 PG (ref 26.6–33)
MCHC RBC AUTO-ENTMCNC: 32.3 G/DL (ref 31.5–35.7)
MCV RBC AUTO: 94.7 FL (ref 79–97)
MONOCYTES # BLD AUTO: 0.64 10*3/MM3 (ref 0.1–0.9)
MONOCYTES NFR BLD AUTO: 10.1 % (ref 5–12)
NEUTROPHILS # BLD AUTO: 3.95 10*3/MM3 (ref 1.7–7)
NEUTROPHILS NFR BLD AUTO: 62.1 % (ref 42.7–76)
NRBC BLD AUTO-RTO: 0 /100 WBC (ref 0–0.2)
PLATELET # BLD AUTO: 255 10*3/MM3 (ref 140–450)
POTASSIUM SERPL-SCNC: 4 MMOL/L (ref 3.5–5.2)
PROT SERPL-MCNC: 6.9 G/DL (ref 6–8.5)
RBC # BLD AUTO: 3.96 10*6/MM3 (ref 3.77–5.28)
SODIUM SERPL-SCNC: 144 MMOL/L (ref 136–145)
VIT B12 SERPL-MCNC: 280 PG/ML (ref 211–946)
WBC # BLD AUTO: 6.35 10*3/MM3 (ref 3.4–10.8)

## 2021-06-30 ENCOUNTER — OFFICE VISIT (OUTPATIENT)
Dept: INTERNAL MEDICINE | Facility: CLINIC | Age: 86
End: 2021-06-30

## 2021-06-30 VITALS
SYSTOLIC BLOOD PRESSURE: 140 MMHG | DIASTOLIC BLOOD PRESSURE: 80 MMHG | HEART RATE: 82 BPM | HEIGHT: 63 IN | TEMPERATURE: 97.1 F | OXYGEN SATURATION: 99 % | BODY MASS INDEX: 21.09 KG/M2 | WEIGHT: 119 LBS

## 2021-06-30 DIAGNOSIS — R73.03 PREDIABETES: ICD-10-CM

## 2021-06-30 DIAGNOSIS — I10 ESSENTIAL HYPERTENSION: Primary | ICD-10-CM

## 2021-06-30 DIAGNOSIS — K59.09 OTHER CONSTIPATION: ICD-10-CM

## 2021-06-30 DIAGNOSIS — E55.9 VITAMIN D DEFICIENCY: ICD-10-CM

## 2021-06-30 DIAGNOSIS — I67.1 CEREBRAL ANEURYSM: ICD-10-CM

## 2021-06-30 DIAGNOSIS — E53.8 B12 DEFICIENCY: ICD-10-CM

## 2021-06-30 PROCEDURE — 99214 OFFICE O/P EST MOD 30 MIN: CPT | Performed by: INTERNAL MEDICINE

## 2021-06-30 RX ORDER — LISINOPRIL 20 MG/1
TABLET ORAL
Qty: 30 TABLET | Refills: 2 | Status: SHIPPED | OUTPATIENT
Start: 2021-06-30

## 2021-06-30 NOTE — PROGRESS NOTES
Subjective   Alberta Suarez is a 86 y.o. female.     Chief Complaint   Patient presents with   • Follow-up     recent lab results and imaging   • Hypertension       History of Present Illness   Patient here for follow-up.  Blood pressure slightly elevated.  Patient states that she is only taking 10 mg lisinopril instead of 30 mg of lisinopril.  Sugar slightly elevated B12 and vitamin D slightly decreased.  Cerebral aneurysm MRI no changes.  Constipation improved.c/o sinus congestion running     Current Outpatient Medications:   •  amLODIPine (NORVASC) 5 MG tablet, TAKE 1 TABLET (5MG) BY MOUTH EVERY DAY, Disp: 90 tablet, Rfl: 3  •  calcium-vitamin D (OSCAL 500/200 D-3) 500-200 MG-UNIT per tablet, Take 1 tablet by mouth Daily., Disp: 30 tablet, Rfl: 0  •  lisinopril (PRINIVIL,ZESTRIL) 20 MG tablet, 1 daily po, Disp: 30 tablet, Rfl: 2  •  omeprazole (priLOSEC) 40 MG capsule, TAKE 1 CAPSULE BY MOUTH 2 (TWO) TIMES A DAY., Disp: 180 capsule, Rfl: 3  •  ondansetron (ZOFRAN) 4 MG tablet, Take 1 tablet by mouth Every 8 (Eight) Hours As Needed for Nausea or Vomiting., Disp: 20 tablet, Rfl: 0    The following portions of the patient's history were reviewed and updated as appropriate: allergies, current medications, past family history, past medical history, past social history, past surgical history and problem list.    Review of Systems   Constitutional: Negative.    HENT: Positive for congestion and postnasal drip.    Respiratory: Negative.    Cardiovascular: Negative.    Gastrointestinal: Negative.    Musculoskeletal: Negative.    Skin: Negative.    Neurological: Negative.    Psychiatric/Behavioral: Negative.        Objective   Physical Exam  Cardiovascular:      Rate and Rhythm: Normal rate and regular rhythm.      Heart sounds: Normal heart sounds.   Pulmonary:      Effort: Pulmonary effort is normal.      Breath sounds: Normal breath sounds.   Abdominal:      General: Bowel sounds are normal.   Musculoskeletal:       Cervical back: Neck supple.   Skin:     General: Skin is warm.   Neurological:      Mental Status: She is alert and oriented to person, place, and time.         All tests have been reviewed.    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 MRI negative aneurysm,  MRA two small aneurysm.   valvular heart disease. Obtain records.   hyperlipidemia unable to take Lipitor, caused blister in mouth. Refuse all cholesterol med.   chronic intersitial cystitis, follow up with Dr. Ramirez, s/p DMSO better.  LBP mild and watch, refill meds CT scan NSD  refuse colonscopy, mamm, again 9/21/17  flu shot patient refuses  eso ca follow up with onc,   vitd low continue vitD3 1000iu daily  pneumovax done, prevnar ?done.  refuse shingle shot and Td

## 2021-07-27 ENCOUNTER — OFFICE VISIT (OUTPATIENT)
Dept: INTERNAL MEDICINE | Facility: CLINIC | Age: 86
End: 2021-07-27

## 2021-07-27 VITALS
HEART RATE: 75 BPM | HEIGHT: 63 IN | DIASTOLIC BLOOD PRESSURE: 91 MMHG | TEMPERATURE: 97.5 F | OXYGEN SATURATION: 95 % | SYSTOLIC BLOOD PRESSURE: 179 MMHG | WEIGHT: 118 LBS | BODY MASS INDEX: 20.91 KG/M2 | RESPIRATION RATE: 14 BRPM

## 2021-07-27 DIAGNOSIS — I10 ESSENTIAL HYPERTENSION: ICD-10-CM

## 2021-07-27 DIAGNOSIS — L29.9 PRURITIC DERMATITIS: Primary | ICD-10-CM

## 2021-07-27 PROCEDURE — 99214 OFFICE O/P EST MOD 30 MIN: CPT | Performed by: NURSE PRACTITIONER

## 2021-07-27 RX ORDER — PREDNISONE 20 MG/1
TABLET ORAL
Qty: 7 TABLET | Refills: 0 | Status: SHIPPED | OUTPATIENT
Start: 2021-07-27 | End: 2021-08-04

## 2021-07-27 RX ORDER — LORATADINE 10 MG/1
10 TABLET ORAL DAILY
Qty: 14 TABLET | Refills: 0 | OUTPATIENT
Start: 2021-07-27 | End: 2021-08-30

## 2021-07-27 NOTE — PROGRESS NOTES
"Date: 2021    Name: Alberta Suarez  : 1935    Chief Complaint:   Chief Complaint   Patient presents with   • Rash     lower back, itching in head and under breasts       HPI:  Alberta Suarez is a 86 y.o. female presents with itching that has been present for a week.  Itching started in her hair.  Itching has moved down to her shoulders, chest, back and legs.  She has taken benadryl, ineffective.  Had changed laundry detergent prior to onset of itching. Has switched back to regular laundry detergent and has rewashed all clothing and linens washed with different detergent.  No other change in personal or household products. No new medications, food.  Her  has had poison ivy on his arms, she is allergic to it.  Itching wakes her from sleep.    She has stopped taking medications sequentially, to determine if they may cause itching.   She takes amlodipine 5 mg, lisinopril 20 mg daily for HTN; currently taking one and not the other. Does not monitor at home.  Denies chest pain, dyspnea, orthopnea, palpitations, lower extremity edema, confusion, headaches, weakness, visual disturbances.     History:  The following portions of the patient's history were reviewed and updated as appropriate: allergies, current medications, past medical history, family history, surgical history, social history and problem list.     VS:  Vitals:    21 1526   BP: 179/91   Pulse: 75   Resp: 14   Temp: 97.5 °F (36.4 °C)   SpO2: 95%   Weight: 53.5 kg (118 lb)   Height: 160 cm (62.99\")     Body mass index is 20.91 kg/m².    PE:  Physical Exam  Constitutional:       Appearance: She is not ill-appearing.   HENT:      Head: Normocephalic.      Right Ear: External ear normal.      Left Ear: External ear normal.   Eyes:      Conjunctiva/sclera: Conjunctivae normal.      Pupils: Pupils are equal, round, and reactive to light.   Cardiovascular:      Rate and Rhythm: Normal rate and regular rhythm.      Pulses: Normal pulses.      " Heart sounds: Normal heart sounds.   Pulmonary:      Effort: Pulmonary effort is normal.      Breath sounds: Normal breath sounds.   Musculoskeletal:      Cervical back: Normal range of motion and neck supple.   Skin:     General: Skin is warm.      Capillary Refill: Capillary refill takes less than 2 seconds.      Comments: Scattered pinpoint scabs lumbar back, bilateral   Neurological:      Mental Status: She is alert and oriented to person, place, and time.      Coordination: Coordination normal.      Gait: Gait normal.   Psychiatric:         Mood and Affect: Mood normal.         Behavior: Behavior normal.         Thought Content: Thought content normal.         Assessment/Plan:  Diagnoses and all orders for this visit:    1. Pruritic dermatitis (Primary)  -     predniSONE (DELTASONE) 20 MG tablet; Take 1 tablet by mouth Daily for 5 days, THEN 0.5 tablets Daily for 3 days.  Dispense: 7 tablet; Refill: 0.  Advised of side effects, proper administration.   -     loratadine (Claritin) 10 MG tablet; Take 1 tablet by mouth Daily.  Dispense: 14 tablet; Refill: 0  -     triamcinolone (KENALOG) 0.1 % ointment; Apply  topically to the appropriate area as directed 2 (Two) Times a Day As Needed for Rash.  Dispense: 80 g; Refill: 0  - Mild soap, lotion.  May use colloidal oatmeal products for bathing, if desired. Tepid water for bathing    2. Essential hypertension        - Follow heart healthy diet.  Advised to reduce daily sodium intake to < 1500 mg per day.  Avoid processed & fast foods.        - Take medications as prescribed. Advised to take them both every day.      Return for Next scheduled follow up. Has appointment scheduled with Dr Garrett in August.

## 2021-09-09 ENCOUNTER — OFFICE VISIT (OUTPATIENT)
Dept: INTERNAL MEDICINE | Facility: CLINIC | Age: 86
End: 2021-09-09

## 2021-09-09 VITALS
OXYGEN SATURATION: 98 % | HEART RATE: 78 BPM | TEMPERATURE: 97.1 F | DIASTOLIC BLOOD PRESSURE: 84 MMHG | HEIGHT: 63 IN | BODY MASS INDEX: 20.73 KG/M2 | WEIGHT: 117 LBS | SYSTOLIC BLOOD PRESSURE: 140 MMHG

## 2021-09-09 DIAGNOSIS — E55.9 VITAMIN D DEFICIENCY: ICD-10-CM

## 2021-09-09 DIAGNOSIS — L08.1 ERYTHRASMA: ICD-10-CM

## 2021-09-09 DIAGNOSIS — I10 ESSENTIAL HYPERTENSION: Primary | ICD-10-CM

## 2021-09-09 DIAGNOSIS — R73.03 PREDIABETES: ICD-10-CM

## 2021-09-09 DIAGNOSIS — M54.16 LUMBAR RADICULOPATHY: ICD-10-CM

## 2021-09-09 DIAGNOSIS — E53.8 B12 DEFICIENCY: ICD-10-CM

## 2021-09-09 LAB
BILIRUB BLD-MCNC: NEGATIVE MG/DL
CLARITY, POC: CLEAR
COLOR UR: YELLOW
GLUCOSE UR STRIP-MCNC: NEGATIVE MG/DL
KETONES UR QL: NEGATIVE
LEUKOCYTE EST, POC: ABNORMAL
NITRITE UR-MCNC: NEGATIVE MG/ML
PH UR: 6 [PH] (ref 5–8)
PROT UR STRIP-MCNC: NEGATIVE MG/DL
RBC # UR STRIP: NEGATIVE /UL
SP GR UR: 1.01 (ref 1–1.03)
UROBILINOGEN UR QL: NORMAL

## 2021-09-09 PROCEDURE — 99214 OFFICE O/P EST MOD 30 MIN: CPT | Performed by: INTERNAL MEDICINE

## 2021-09-09 PROCEDURE — 81003 URINALYSIS AUTO W/O SCOPE: CPT | Performed by: INTERNAL MEDICINE

## 2021-09-09 RX ORDER — CLOTRIMAZOLE AND BETAMETHASONE DIPROPIONATE 10; .64 MG/G; MG/G
1 CREAM TOPICAL 2 TIMES DAILY
Qty: 45 G | Refills: 1 | Status: SHIPPED | OUTPATIENT
Start: 2021-09-09 | End: 2022-04-12

## 2021-09-09 NOTE — PROGRESS NOTES
Subjective   Alberta Suarez is a 86 y.o. female.     Chief Complaint   Patient presents with   • Follow-up   • Rash     symptoms still persist after medication        History of Present Illness   Patient complaints rash in the neck scalp on the right side and also lower back right groin area and also under the breast itchy scratchy multiple treatments with steroid no help.  Patient also complains right lower back flank area pain mild tenderness.  Blood pressure stable on medication.  Patient denies any dysuria frequency urgency.  Vitamin deficiency on supplements stable.  Prediabetes is stable now.    Current Outpatient Medications:   •  amLODIPine (NORVASC) 5 MG tablet, TAKE 1 TABLET (5MG) BY MOUTH EVERY DAY, Disp: 90 tablet, Rfl: 3  •  lisinopril (PRINIVIL,ZESTRIL) 20 MG tablet, 1 daily po, Disp: 30 tablet, Rfl: 2  •  clotrimazole-betamethasone (Lotrisone) 1-0.05 % cream, Apply 1 application topically to the appropriate area as directed 2 (Two) Times a Day., Disp: 45 g, Rfl: 1    The following portions of the patient's history were reviewed and updated as appropriate: allergies, current medications, past family history, past medical history, past social history, past surgical history and problem list.    Review of Systems   Constitutional: Negative.    Respiratory: Negative.    Cardiovascular: Negative.    Gastrointestinal: Negative.    Musculoskeletal: Positive for back pain.   Skin: Positive for rash.   Neurological: Negative.    Psychiatric/Behavioral: Negative.        Objective   Physical Exam  Cardiovascular:      Rate and Rhythm: Normal rate and regular rhythm.      Heart sounds: Normal heart sounds.   Pulmonary:      Effort: Pulmonary effort is normal.      Breath sounds: Normal breath sounds.   Abdominal:      General: Bowel sounds are normal.   Musculoskeletal:         General: Tenderness:  Right upper  mild       Cervical back: Neck supple.   Skin:     General: Skin is warm.      Comments: No  apparent rash mild erythema   Neurological:      Mental Status: She is alert and oriented to person, place, and time.         All tests have been reviewed.    Assessment/Plan   Diagnoses and all orders for this visit:    Essential hypertension continue medication    B12 deficiency continue supplement    Right flank pain check lab  -     POCT urinalysis dipstick, automated    Erythrasma steroid no help possible dry skin related  -     clotrimazole-betamethasone (Lotrisone) 1-0.05 % cream; Apply 1 application topically to the appropriate area as directed 2 (Two) Times a Day.    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 MRI negative aneurysm,  MRA two small aneurysm.   valvular heart disease. Obtain records.   hyperlipidemia unable to take Lipitor, caused blister in mouth. Refuse all cholesterol med.   chronic intersitial cystitis, follow up with Dr. Ramirez, s/p DMSO better.  LBP mild and watch, refill meds CT scan NSD  refuse colonscopy, mamm, again 9/21/17  flu shot patient refuses  eso ca follow up with onc,   vitd low continue vitD3 1000iu daily  pneumovax done, prevnar ?done.  refuse shingle shot and Td

## 2021-10-21 ENCOUNTER — TELEPHONE (OUTPATIENT)
Dept: INTERNAL MEDICINE | Facility: CLINIC | Age: 86
End: 2021-10-21

## 2021-10-21 NOTE — TELEPHONE ENCOUNTER
PATIENT CALLED TO INFORM THE OFFICE SHE WAS NOT HAPPY AT HER LAST VISIT AND SHE WILL NO LONGER BE COMING TO THIS PRACTICE

## 2021-10-21 NOTE — TELEPHONE ENCOUNTER
"Attempted to contact patient to discuss concerns; message stated \"The number you have dialed is not in service.\"    Can you please process self discharge?  "

## 2022-03-19 ENCOUNTER — ANESTHESIA EVENT (OUTPATIENT)
Dept: GASTROENTEROLOGY | Facility: HOSPITAL | Age: 87
End: 2022-03-19

## 2022-03-19 ENCOUNTER — HOSPITAL ENCOUNTER (EMERGENCY)
Facility: HOSPITAL | Age: 87
Discharge: HOME | End: 2022-03-19
Attending: EMERGENCY MEDICINE | Admitting: INTERNAL MEDICINE

## 2022-03-19 ENCOUNTER — ANESTHESIA (OUTPATIENT)
Dept: GASTROENTEROLOGY | Facility: HOSPITAL | Age: 87
End: 2022-03-19

## 2022-03-19 ENCOUNTER — APPOINTMENT (OUTPATIENT)
Dept: GENERAL RADIOLOGY | Facility: HOSPITAL | Age: 87
End: 2022-03-19

## 2022-03-19 VITALS
WEIGHT: 113 LBS | TEMPERATURE: 97.8 F | OXYGEN SATURATION: 96 % | BODY MASS INDEX: 20.8 KG/M2 | RESPIRATION RATE: 20 BRPM | SYSTOLIC BLOOD PRESSURE: 161 MMHG | HEIGHT: 62 IN | HEART RATE: 93 BPM | DIASTOLIC BLOOD PRESSURE: 77 MMHG

## 2022-03-19 DIAGNOSIS — K22.2 ESOPHAGEAL OBSTRUCTION DUE TO FOOD IMPACTION: Primary | ICD-10-CM

## 2022-03-19 DIAGNOSIS — K22.2 ESOPHAGEAL STRICTURE: ICD-10-CM

## 2022-03-19 DIAGNOSIS — R13.19 ESOPHAGEAL DYSPHAGIA: ICD-10-CM

## 2022-03-19 DIAGNOSIS — T18.128A ESOPHAGEAL OBSTRUCTION DUE TO FOOD IMPACTION: Primary | ICD-10-CM
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
## 2022-03-19 LAB
ALBUMIN SERPL-MCNC: 4.4 G/DL (ref 3.5–5.2)
ALBUMIN/GLOB SERPL: 1.4 G/DL
ALP SERPL-CCNC: 63 U/L (ref 39–117)
ALT SERPL W P-5'-P-CCNC: 7 U/L (ref 1–33)
ANION GAP SERPL CALCULATED.3IONS-SCNC: 16.5 MMOL/L (ref 5–15)
AST SERPL-CCNC: 16 U/L (ref 1–32)
BASOPHILS # BLD AUTO: 0.04 10*3/MM3 (ref 0–0.2)
BASOPHILS NFR BLD AUTO: 0.6 % (ref 0–1.5)
BILIRUB SERPL-MCNC: 0.3 MG/DL (ref 0–1.2)
BUN SERPL-MCNC: 22 MG/DL (ref 8–23)
BUN/CREAT SERPL: 21.8 (ref 7–25)
CALCIUM SPEC-SCNC: 9.7 MG/DL (ref 8.6–10.5)
CHLORIDE SERPL-SCNC: 105 MMOL/L (ref 98–107)
CO2 SERPL-SCNC: 20.5 MMOL/L (ref 22–29)
CREAT SERPL-MCNC: 1.01 MG/DL (ref 0.57–1)
DEPRECATED RDW RBC AUTO: 44.8 FL (ref 37–54)
EGFRCR SERPLBLD CKD-EPI 2021: 55 ML/MIN/1.73
EOSINOPHIL # BLD AUTO: 0.03 10*3/MM3 (ref 0–0.4)
EOSINOPHIL NFR BLD AUTO: 0.4 % (ref 0.3–6.2)
ERYTHROCYTE [DISTWIDTH] IN BLOOD BY AUTOMATED COUNT: 13.2 % (ref 12.3–15.4)
GLOBULIN UR ELPH-MCNC: 3.1 GM/DL
GLUCOSE BLDC GLUCOMTR-MCNC: 83 MG/DL (ref 70–130)
GLUCOSE SERPL-MCNC: 98 MG/DL (ref 65–99)
HCT VFR BLD AUTO: 38.8 % (ref 34–46.6)
HGB BLD-MCNC: 13.2 G/DL (ref 12–15.9)
HOLD SPECIMEN: NORMAL
HOLD SPECIMEN: NORMAL
IMM GRANULOCYTES # BLD AUTO: 0.03 10*3/MM3 (ref 0–0.05)
IMM GRANULOCYTES NFR BLD AUTO: 0.4 % (ref 0–0.5)
LYMPHOCYTES # BLD AUTO: 1.1 10*3/MM3 (ref 0.7–3.1)
LYMPHOCYTES NFR BLD AUTO: 15.2 % (ref 19.6–45.3)
MCH RBC QN AUTO: 31.4 PG (ref 26.6–33)
MCHC RBC AUTO-ENTMCNC: 34 G/DL (ref 31.5–35.7)
MCV RBC AUTO: 92.2 FL (ref 79–97)
MONOCYTES # BLD AUTO: 0.58 10*3/MM3 (ref 0.1–0.9)
MONOCYTES NFR BLD AUTO: 8 % (ref 5–12)
NEUTROPHILS NFR BLD AUTO: 5.44 10*3/MM3 (ref 1.7–7)
NEUTROPHILS NFR BLD AUTO: 75.4 % (ref 42.7–76)
NRBC BLD AUTO-RTO: 0 /100 WBC (ref 0–0.2)
PLATELET # BLD AUTO: 277 10*3/MM3 (ref 140–450)
PMV BLD AUTO: 9.6 FL (ref 6–12)
POTASSIUM SERPL-SCNC: 3.8 MMOL/L (ref 3.5–5.2)
PROT SERPL-MCNC: 7.5 G/DL (ref 6–8.5)
RBC # BLD AUTO: 4.21 10*6/MM3 (ref 3.77–5.28)
SARS-COV-2 RNA PNL SPEC NAA+PROBE: NOT DETECTED
SODIUM SERPL-SCNC: 142 MMOL/L (ref 136–145)
WBC NRBC COR # BLD: 7.22 10*3/MM3 (ref 3.4–10.8)
WHOLE BLOOD HOLD SPECIMEN: NORMAL
WHOLE BLOOD HOLD SPECIMEN: NORMAL

## 2022-03-19 PROCEDURE — 93005 ELECTROCARDIOGRAM TRACING: CPT | Performed by: EMERGENCY MEDICINE

## 2022-03-19 PROCEDURE — 80053 COMPREHEN METABOLIC PANEL: CPT | Performed by: EMERGENCY MEDICINE

## 2022-03-19 PROCEDURE — 43239 EGD BIOPSY SINGLE/MULTIPLE: CPT | Performed by: INTERNAL MEDICINE

## 2022-03-19 PROCEDURE — 99284 EMERGENCY DEPT VISIT MOD MDM: CPT | Performed by: INTERNAL MEDICINE

## 2022-03-19 PROCEDURE — 43247 EGD REMOVE FOREIGN BODY: CPT | Performed by: INTERNAL MEDICINE

## 2022-03-19 PROCEDURE — 43249 ESOPH EGD DILATION <30 MM: CPT | Performed by: INTERNAL MEDICINE

## 2022-03-19 PROCEDURE — 25010000002 METOCLOPRAMIDE PER 10 MG: Performed by: NURSE ANESTHETIST, CERTIFIED REGISTERED

## 2022-03-19 PROCEDURE — 25010000002 PROPOFOL 1000 MG/100ML EMULSION: Performed by: NURSE ANESTHETIST, CERTIFIED REGISTERED

## 2022-03-19 PROCEDURE — 99284 EMERGENCY DEPT VISIT MOD MDM: CPT

## 2022-03-19 PROCEDURE — C9803 HOPD COVID-19 SPEC COLLECT: HCPCS

## 2022-03-19 PROCEDURE — 71045 X-RAY EXAM CHEST 1 VIEW: CPT

## 2022-03-19 PROCEDURE — 88305 TISSUE EXAM BY PATHOLOGIST: CPT | Performed by: INTERNAL MEDICINE

## 2022-03-19 PROCEDURE — 85025 COMPLETE CBC W/AUTO DIFF WBC: CPT | Performed by: EMERGENCY MEDICINE

## 2022-03-19 PROCEDURE — 87635 SARS-COV-2 COVID-19 AMP PRB: CPT | Performed by: INTERNAL MEDICINE

## 2022-03-19 PROCEDURE — C1726 CATH, BAL DIL, NON-VASCULAR: HCPCS | Performed by: INTERNAL MEDICINE

## 2022-03-19 PROCEDURE — 82962 GLUCOSE BLOOD TEST: CPT

## 2022-03-19 RX ORDER — SODIUM CHLORIDE 9 MG/ML
70 INJECTION, SOLUTION INTRAVENOUS CONTINUOUS PRN
Status: DISCONTINUED | OUTPATIENT
Start: 2022-03-19 | End: 2022-03-19 | Stop reason: HOSPADM

## 2022-03-19 RX ORDER — LIDOCAINE HYDROCHLORIDE 20 MG/ML
INJECTION, SOLUTION INTRAVENOUS AS NEEDED
Status: DISCONTINUED | OUTPATIENT
Start: 2022-03-19 | End: 2022-03-19 | Stop reason: SURG

## 2022-03-19 RX ORDER — SODIUM CHLORIDE 0.9 % (FLUSH) 0.9 %
10 SYRINGE (ML) INJECTION AS NEEDED
Status: DISCONTINUED | OUTPATIENT
Start: 2022-03-19 | End: 2022-03-19 | Stop reason: HOSPADM

## 2022-03-19 RX ORDER — METOCLOPRAMIDE HYDROCHLORIDE 5 MG/ML
INJECTION INTRAMUSCULAR; INTRAVENOUS AS NEEDED
Status: DISCONTINUED | OUTPATIENT
Start: 2022-03-19 | End: 2022-03-19 | Stop reason: SURG

## 2022-03-19 RX ORDER — PANTOPRAZOLE SODIUM 40 MG/1
40 TABLET, DELAYED RELEASE ORAL DAILY
Qty: 30 TABLET | Refills: 5 | Status: SHIPPED | OUTPATIENT
Start: 2022-03-19 | End: 2022-04-12 | Stop reason: SINTOL

## 2022-03-19 RX ORDER — PROPOFOL 10 MG/ML
INJECTION, EMULSION INTRAVENOUS AS NEEDED
Status: DISCONTINUED | OUTPATIENT
Start: 2022-03-19 | End: 2022-03-19 | Stop reason: SURG

## 2022-03-19 RX ADMIN — PROPOFOL 50 MG: 10 INJECTION, EMULSION INTRAVENOUS at 13:51

## 2022-03-19 RX ADMIN — LIDOCAINE HYDROCHLORIDE 60 MG: 20 INJECTION, SOLUTION INTRAVENOUS at 13:37

## 2022-03-19 RX ADMIN — PROPOFOL 50 MG: 10 INJECTION, EMULSION INTRAVENOUS at 13:37

## 2022-03-19 RX ADMIN — SODIUM CHLORIDE 500 ML: 9 INJECTION, SOLUTION INTRAVENOUS at 11:10

## 2022-03-19 RX ADMIN — METOCLOPRAMIDE 20 MG: 5 INJECTION, SOLUTION INTRAMUSCULAR; INTRAVENOUS at 13:40

## 2022-03-19 NOTE — CONSULTS
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     Patient states that she 3 days ago she was eating chicken nuggets and felt that the meat did not go down and stuck in the upper esophagus.  Since then she was not able to eat anything except few sips of liquids.  She feels that her mid piece may have passed down however she is not able to drink or eat anything.  Denies any chest pain.  Abdominal pain and nausea vomiting. She had multiple episodes of food bolus impaction in the past since  and at least a couple of times in /.  Last EGD for food bolus impaction was in 2020, at that time she was noted to have a severe stenosis in the upper esophagus mostly from the multiple esophageal webs which was dilated with a savory dilatation up to 12 mm.  She was advised to follow-up with a repeat EGD in 4 to 6 weeks time to dilate further up to 12-14 or 12 to 16mm, however patient did not keep up the appointment.  She was doing well with the swallowing until about 6 months ago started developing again significant issue with the swallowing.    As per record last EGD done in [facility] was in 2019.  At that time he had multiple esophageal strictures  noted in the proximal and mid esophagus.  There was also soft to polypoid mass lesion noted in the mid esophagus about 20- 22 cm from incisors.  There was also a few ulcerations in the mid and proximal esophagus. Biopsies were obtained at that time revealed a Candida esophagitis.  Biopsy of the mass lesion revealed a necrotic findings with ulcerations and inflammatory findings without any neoplastic cells.  Her upper esophageal stricture was dilated with a balloon dilator 8-9-10 mm at that time.     She subsequently was seen in the [facility] in May 2019.  Her EGD done at that time reconfirmed the esophageal lesion again.  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 she had a temporary esophageal stent placed and had a PEG tube placed.  She subsequently had a chemoradiation however she was not able to complete the full course of treatment due to adverse reaction.  She was seen in 2019 with a further follow-up with the CT PET in 3 months time and possible chemoradiation again if patient decides on proceeding with that.      As per patient she states that she had a CT PET done in  , which did not reveal any significant signs of any recurrence or tumor burden.  She follows oncology at [facility].    Subjective     Past Medical History:   Diagnosis Date   • Cancer (HCC)     esophagus   • Hard of hearing    • History of blood transfusion    • Hypertension    • Vertigo        Past Surgical History:   Procedure Laterality Date   • APPENDECTOMY  approx    •  SECTION     • DENTAL PROCEDURE     • ENDOSCOPY N/A 2019    Procedure: ESOPHAGOGASTRODUODENOSCOPY with dilation, removal foreign body and cold forcep biopsies;  Surgeon: Rajesh Schultz MD;  Location: [facility] ENDOSCOPY;  Service: Gastroenterology   • ENDOSCOPY N/A 2020    Procedure: ESOPHAGOGASTRODUODENOSCOPY WITH DILATATION;  Surgeon: Genia Ricketts MD;   Location: [facility] ENDOSCOPY;  Service: Gastroenterology;  Laterality: N/A;   • ESOPHAGUS SURGERY      Stent placed   • GASTROSTOMY FEEDING TUBE INSERTION     • HYSTERECTOMY  [id number]   • TOTAL KNEE ARTHROPLASTY  approx 2010       Family History   Problem Relation Age of Onset   • Colon cancer Neg Hx        Social History     Socioeconomic History   • Marital status:    Tobacco Use   • Smoking status: Never Smoker   • Smokeless tobacco: Never Used   Substance and Sexual Activity   • Alcohol use: No   • Drug use: No   • Sexual activity: Defer         Current Facility-Administered Medications:   •  sodium chloride 0.9 % bolus 500 mL, 500 mL, Intravenous, Once, Gilbert, Shabbir B, DO  •  sodium chloride 0.9 % flush 10 mL, 10 mL, Intravenous, PRN, Gilbert, Shabbir B, DO  •  [CANCELED] Insert peripheral IV, , , Once **AND** sodium chloride 0.9 % flush 10 mL, 10 mL, Intravenous, PRN, Gilbert, Shabbir B, DO    Current Outpatient Medications:   •  amLODIPine (NORVASC) 5 MG tablet, TAKE 1 TABLET (5MG) BY MOUTH EVERY DAY, Disp: 90 tablet, Rfl: 3  •  clotrimazole-betamethasone (Lotrisone) 1-0.05 % cream, Apply 1 application topically to the appropriate area as directed 2 (Two) Times a Day., Disp: 45 g, Rfl: 1  •  lisinopril (PRINIVIL,ZESTRIL) 20 MG tablet, 1 daily po, Disp: 30 tablet, Rfl: 2    Allergies   Allergen Reactions   • Atorvastatin Rash     Blisters in mouth   • Bactrim [Sulfamethoxazole-Trimethoprim] Itching and Rash       Review of Systems   Constitutional: Negative for fever.   HENT: Positive for trouble swallowing. Negative for sore throat.    Eyes: Negative for visual disturbance.   Respiratory: Negative for cough, chest tightness and shortness of breath.    Cardiovascular: Negative for chest pain, palpitations and leg swelling.   Gastrointestinal: Negative for abdominal pain, blood in stool, constipation, diarrhea, nausea, vomiting and indigestion.   Endocrine: Negative for polyphagia.   Genitourinary: Negative  for dysuria and hematuria.   Musculoskeletal: Negative for back pain, joint swelling and neck pain.   Skin: Negative for rash, skin lesions and wound.   Neurological: Negative for dizziness, seizures, speech difficulty, weakness, numbness and confusion.   Hematological: Negative for adenopathy. Does not bruise/bleed easily.   Psychiatric/Behavioral: Negative for hallucinations and depressed mood.        The following portions of the patient's history were reviewed and updated as appropriate: allergies, current medications, past family history, past medical history, past social history, past surgical history and problem list.    Objective     Vitals:    03/19/22 1021 03/19/22 1022 03/19/22 1030 03/19/22 1100   BP: 163/75      BP Location:       Patient Position:       Pulse:   84 86   Resp:   16 16   Temp:       TempSrc:       SpO2:  96% 97% 96%   Weight:       Height:           Physical Exam  Vitals and nursing note reviewed.   Constitutional:       Appearance: She is well-developed.      Comments: Frail elderly lady   HENT:      Head: Normocephalic and atraumatic.      Right Ear: External ear normal.      Left Ear: External ear normal.   Eyes:      Conjunctiva/sclera: Conjunctivae normal.   Neck:      Thyroid: No thyromegaly.      Trachea: No tracheal deviation.   Cardiovascular:      Rate and Rhythm: Normal rate and regular rhythm.      Heart sounds: No murmur heard.  Pulmonary:      Effort: Pulmonary effort is normal. No respiratory distress.      Breath sounds: Normal breath sounds.   Abdominal:      General: Bowel sounds are normal. There is no distension.      Palpations: Abdomen is soft. There is no mass.      Tenderness: There is no abdominal tenderness.      Hernia: No hernia is present.   Musculoskeletal:         General: Normal range of motion.      Cervical back: Normal range of motion.   Skin:     General: Skin is warm and dry.   Neurological:      Mental Status: She is alert and oriented to person,  place, and time.      Cranial Nerves: No cranial nerve deficit.      Sensory: No sensory deficit.   Psychiatric:         Behavior: Behavior normal.         Thought Content: Thought content normal.         Judgment: Judgment normal.         Results from last 7 days   Lab Units 03/19/22  1021   WBC 10*3/mm3 7.22   HEMOGLOBIN g/dL 13.2   PLATELETS 10*3/mm3 277       Imaging Results (Last 24 Hours)     Procedure Component Value Units Date/Time    XR Chest 1 View [[id number]] Resulted: 03/19/22 1107     Updated: 03/19/22 1107          Assessment / Plan      Assessment/Recommendations:   1.  Suspected esophageal obstruction due to food bolus impaction  2.  History of multiple upper esophageal web and stenosis with a prior history of multiple food bolus impaction and dilatation  3.  History of squamous cell carcinoma esophagus status post chemoradiation 2019    Patient may have passed her food bolus down.  However at this time patient is not able to swallow any solids and even the liquids as per patient.  She is known to have a severe esophageal web/circumferential rings in the upper esophagus which was dilated multiple times.  Last EGD was in 2020, unable to pass the scope and scope was passed after savory dilatation to 10 to 12 mm.  She did not keep up the appointment to follow-up and further dilation.  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 Received only partial treatment with resolution of tumor mass. Not followed since 2020.    Given her severe dysphagia symptoms she has been scheduled for an urgent EGD with a dislodgment of food bolus and possible esophageal dilatation.She may also have recurrence of her tumor    Patient is high slightly high risk for any complications associated with the dilatation including esophageal perforation that has been discussed with the patient.  Risk and benefits have been discussed with her and  she is agreeable to proceed with the procedure.   Keep n.p.o.  Scheduled for an urgent EGD and possible dilatation  Will recommend further after endoscopy.        Thank you very much for letting me participate in the care of this patient.  Please do not hesitate to call me if you have any questions.    Genia Ricketts MD  [facility] [place]  3/19/2022  11:07 EDT    Please note that portions of this note may have been completed with a voice recognition program.

## 2022-03-19 NOTE — ED NOTES
This RN contacted by House Supervisor related to patient scope. This RN advised that patient will be waiting a few hrs for scope related to another procedure taking priority at this time. Pt informed per this RN at this time. Pt verbalized an understanding and understands orders to remain NPO. Pt  at bedside.

## 2022-03-19 NOTE — ANESTHESIA PREPROCEDURE EVALUATION
Anesthesia Evaluation     Patient summary reviewed and Nursing notes reviewed   no history of anesthetic complications:  NPO Solid Status: > 8 hours  NPO Liquid Status: > 8 hours           Airway   Mallampati: I  TM distance: >3 FB  Neck ROM: full  Possible difficult intubation  Dental - normal exam   (+) lower dentures and upper dentures    Pulmonary - normal exam   Cardiovascular - normal exam    Rhythm: regular  Rate: normal    (+) hypertension, PVD, hyperlipidemia,       Neuro/Psych  (+) dizziness/light headedness,    GI/Hepatic/Renal/Endo    (+)  GERD,      Musculoskeletal     (+) neck pain,   Abdominal  - normal exam    Abdomen: soft.  Bowel sounds: normal.   Substance History      OB/GYN          Other      history of cancer                      Anesthesia Plan    ASA 3     MAC   (Risks and benefits discussed including risk of aspiration, recall and dental damage. All patient questions answered. Will continue with POC.)  intravenous induction     Anesthetic plan, all risks, benefits, and alternatives have been provided, discussed and informed consent has been obtained with: patient.

## 2022-03-19 NOTE — DISCHARGE INSTRUCTIONS
- Discharge patient to home (ambulatory).   - Clear liquid diet today.   - Chopped food from tomorrow  - PPI daily  - No ASA/NSAIDS for 3 days   - Await pathology results.   - Return to my office in 8 weeks.    Please follow all post op instructions and follow up appointment time from your physician's office included in your discharge packet.    REST TODAY    No pushing, pulling, tugging,  heavy lifting, or strenuous activity.  No major decision making, driving, or drinking alcoholic beverages for 24 hours. ( due to the medications you have  received)  Always use good hand hygiene/washing techniques.  NO driving while taking pain medications.    To assist you in voiding:  Drink plenty of fluids  Listen to running water while attempting to void.    If you are unable to urinate and you have an uncomfortable urge to void or it has been   6 hours since you were discharged, return to the Emergency Room

## 2022-03-19 NOTE — ED NOTES
Pt transported to OR at this time. Personal belongings with patient at time of departure from ER.

## 2022-03-19 NOTE — ANESTHESIA POSTPROCEDURE EVALUATION
Patient: Alberta Suarez    Procedure Summary     Date: 03/19/22 Room / Location: [facility] ENDOSCOPY 2 / [facility] ENDOSCOPY    Anesthesia Start: 1336 Anesthesia Stop: 1401    Procedure: ESOPHAGOGASTRODUODENOSCOPY, removal of food bolus, and balloon dilation (N/A Esophagus) Diagnosis:       Esophageal obstruction due to food impaction      (Esophageal obstruction due to food impaction [K22.2, T18.128A])    Surgeons: Genia Ricketts MD Provider: Reed Caal CRNA    Anesthesia Type: MAC ASA Status: 3          Anesthesia Type: MAC    Vitals  Vitals Value Taken Time   /77 03/19/22 1432   Temp 97.8 °F (36.6 °C) 03/19/22 1432   Pulse 93 03/19/22 1432   Resp 20 03/19/22 1432   SpO2 96 % 03/19/22 1432           Post Anesthesia Care and Evaluation    Patient location during evaluation: bedside  Patient participation: complete - patient participated  Level of consciousness: awake  Pain score: 0  Pain management: adequate  Airway patency: patent  Anesthetic complications: No anesthetic complications  PONV Status: controlled  Cardiovascular status: acceptable and stable  Respiratory status: acceptable and room air  Hydration status: acceptable

## 2022-03-19 NOTE — ED PROVIDER NOTES
Subjective   86-year-old female presents to the ED with a chief complaint of difficulty swallowing.  The patient has a history of esophageal cancer and has had radiation.  She has also had multiple episodes of esophageal foreign body and or esophageal stricture requiring dilatation.  Her most recent was in  by Dr. Aida Camp.  She states that 3 days ago she was eating a chicken nuggets when she felt like it got stuck in her throat.  Since then she has had difficulty swallowing or tolerating anything more than small sips of liquid.  She states that her swallowing ability has gotten worse over the last 6 months got much worse over the last 3 days.  She states that she believes she has passed a small piece of chicken nuggets but could still only tolerate the small sips of liquids.  No fever chills.  No nausea vomiting diarrhea abdominal pain.  No other complaints at this time.          Review of Systems   HENT: Positive for trouble swallowing.    All other systems reviewed and are negative.      Past Medical History:   Diagnosis Date   • Cancer (HCC)     esophagus   • Hard of hearing    • History of blood transfusion    • Hypertension    • Vertigo        Allergies   Allergen Reactions   • Atorvastatin Rash     Blisters in mouth   • Bactrim [Sulfamethoxazole-Trimethoprim] Itching and Rash       Past Surgical History:   Procedure Laterality Date   • APPENDECTOMY  approx    •  SECTION     • DENTAL PROCEDURE     • ENDOSCOPY N/A 2019    Procedure: ESOPHAGOGASTRODUODENOSCOPY with dilation, removal foreign body and cold forcep biopsies;  Surgeon: Rajesh Schultz MD;  Location: [facility] ENDOSCOPY;  Service: Gastroenterology   • ENDOSCOPY N/A 2020    Procedure: ESOPHAGOGASTRODUODENOSCOPY WITH DILATATION;  Surgeon: Genia Ricketts MD;  Location: [facility] ENDOSCOPY;  Service: Gastroenterology;  Laterality: N/A;   • ESOPHAGUS SURGERY      Stent placed   • GASTROSTOMY FEEDING TUBE INSERTION     •  HYSTERECTOMY  [id number]   • TOTAL KNEE ARTHROPLASTY  approx 2010       Family History   Problem Relation Age of Onset   • Colon cancer Neg Hx        Social History     Socioeconomic History   • Marital status:    Tobacco Use   • Smoking status: Never Smoker   • Smokeless tobacco: Never Used   Substance and Sexual Activity   • Alcohol use: No   • Drug use: No   • Sexual activity: Defer           Objective   Physical Exam  Vitals and nursing note reviewed.   Constitutional:       General: She is not in acute distress.     Appearance: She is well-developed. She is not diaphoretic.      Comments: Elderly-appearing.  Thin.  Cachectic.   HENT:      Head: Normocephalic and atraumatic.      Nose: Nose normal.   Eyes:      Conjunctiva/sclera: Conjunctivae normal.   Neck:      Comments: Tolerating very small sips of liquids  Cardiovascular:      Rate and Rhythm: Normal rate and regular rhythm.      Pulses: Normal pulses.   Pulmonary:      Effort: Pulmonary effort is normal. No respiratory distress.      Breath sounds: Normal breath sounds.   Abdominal:      General: There is no distension.      Palpations: Abdomen is soft.      Tenderness: There is no abdominal tenderness. There is no guarding.   Musculoskeletal:         General: No deformity.   Neurological:      Mental Status: She is alert and oriented to person, place, and time.      Cranial Nerves: No cranial nerve deficit.         Procedures           ED Course  ED Course as of 03/19/22 1127   Sat Mar 19, 2022   1059 EKG interpreted by me.  Sinus rhythm.  Rate of 88.  Low voltage in chest leads.  No ST segment or T wave changes.  Normal EKG. [CG]      ED Course User Index  [CG] Shabbir Lindquist, DO                                                 MDM  86-year-old female presents to the ED with likely esophageal stricture versus esophageal food bolus.  Discussed with Dr. Ricketts, gastroenterology on-call.  He will evaluate and plan for EGD today.      Final  diagnoses:   Esophageal stricture   Esophageal dysphagia       ED Disposition  ED Disposition     ED Disposition   Send to OR    Condition   --    Comment   --             No follow-up provider specified.       Medication List      No changes were made to your prescriptions during this visit.          Shabbir Lindquist, DO  03/19/22 1127

## 2022-03-24 LAB
LAB AP CASE REPORT: NORMAL
PATH REPORT.FINAL DX SPEC: NORMAL

## 2022-04-12 ENCOUNTER — OFFICE VISIT (OUTPATIENT)
Dept: GASTROENTEROLOGY | Facility: CLINIC | Age: 87
End: 2022-04-12

## 2022-04-12 VITALS
DIASTOLIC BLOOD PRESSURE: 80 MMHG | BODY MASS INDEX: 21.53 KG/M2 | WEIGHT: 117 LBS | HEIGHT: 62 IN | TEMPERATURE: 97.3 F | SYSTOLIC BLOOD PRESSURE: 144 MMHG

## 2022-04-12 DIAGNOSIS — K22.9 ESOPHAGEAL LESION: Primary | ICD-10-CM

## 2022-04-12 DIAGNOSIS — K22.2 ESOPHAGEAL RING, ACQUIRED: ICD-10-CM

## 2022-04-12 DIAGNOSIS — Q39.4 ESOPHAGEAL WEB: ICD-10-CM

## 2022-04-12 DIAGNOSIS — R13.19 ESOPHAGEAL DYSPHAGIA: ICD-10-CM

## 2022-04-12 DIAGNOSIS — K22.2 ESOPHAGEAL STRICTURE: ICD-10-CM

## 2022-04-12 PROCEDURE — 99214 OFFICE O/P EST MOD 30 MIN: CPT | Performed by: INTERNAL MEDICINE

## 2022-04-12 RX ORDER — OMEPRAZOLE 40 MG/1
40 CAPSULE, DELAYED RELEASE ORAL DAILY
COMMUNITY

## 2022-04-12 RX ORDER — SODIUM CHLORIDE 9 MG/ML
70 INJECTION, SOLUTION INTRAVENOUS CONTINUOUS PRN
Status: CANCELLED | OUTPATIENT
Start: 2022-04-12

## 2022-04-12 NOTE — PROGRESS NOTES
Follow Up Note     Date: 2022   Patient Name: Alberta Suarez  MRN: [id number]  : 1935     Referring Physician: Antonella Moore DO    Chief Complaint:    Chief Complaint   Patient presents with   • Difficulty Swallowing       Interval History:   2022  Alberta Suarez is a 86 y.o. female who is here today for follow up for her dysphagia and recent food bolus impaction.  Patient is doing very well since the EGD and dilatation.  She states that food is going down without any issues now.  Schedule follow-up with pathology report and further plan.    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     Patient states that she 3 days ago she was eating chicken nuggets and felt that the meat did not go down and stuck in the upper esophagus.  Since then she was not able to eat anything except few sips of liquids.  She feels that her mid piece may have passed down however she is not able to drink or eat anything.  Denies any chest pain.  Abdominal pain and nausea vomiting. She had multiple episodes of food bolus impaction in the past since  and at least a couple of times in 2018/.  Last EGD for food bolus impaction was in 2020, at that time she was noted to have a severe stenosis in the upper esophagus mostly from the multiple esophageal webs which was dilated with a savory dilatation up to 12 mm.  She was advised to follow-up with a repeat EGD in 4 to 6 weeks time to dilate further up to 12-14 or 12 to 16mm, however patient did not keep up the appointment.  She  was doing well with the swallowing until about 6 months ago started developing again significant issue with the swallowing.     As per record last EGD done in [facility] was in 2019.  At that time he had multiple esophageal strictures noted in the proximal and mid esophagus.  There was also soft to polypoid mass lesion noted in the mid esophagus about 20- 22 cm from incisors.  There was also a few ulcerations in the mid and proximal esophagus. Biopsies were obtained at that time revealed a Candida esophagitis.  Biopsy of the mass lesion revealed a necrotic findings with ulcerations and inflammatory findings without any neoplastic cells.  Her upper esophageal stricture was dilated with a balloon dilator 8-9-10 mm at that time.     She subsequently was seen in the [facility] in May 2019.  Her EGD done at that time reconfirmed the esophageal lesion again.  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 she had a temporary esophageal stent placed and had a PEG tube placed.  She subsequently had a chemoradiation however she was not able to complete the full course of treatment due to adverse reaction.  She was seen in 2019 with a further follow-up with the CT PET in 3 months time and possible chemoradiation again if patient decides on proceeding with that.      As per patient she states that she had a CT PET done in  , which did not reveal any significant signs of any recurrence or tumor burden.  She follows oncology at [facility]  Subjective      Past Medical History:   Past Medical History:   Diagnosis Date   • Cancer (HCC)     esophagus   • Hard of hearing    • History of blood transfusion    • Hypertension    • Vertigo      Past Surgical History:   Past Surgical History:   Procedure Laterality Date   • APPENDECTOMY  approx    •  SECTION     • DENTAL PROCEDURE     • ENDOSCOPY N/A 2019    Procedure:  ESOPHAGOGASTRODUODENOSCOPY with dilation, removal foreign body and cold forcep biopsies;  Surgeon: Rajesh Schultz MD;  Location: [facility] ENDOSCOPY;  Service: Gastroenterology   • ENDOSCOPY N/A 6/22/2020    Procedure: ESOPHAGOGASTRODUODENOSCOPY WITH DILATATION;  Surgeon: Genia Ricketts MD;  Location: [facility] ENDOSCOPY;  Service: Gastroenterology;  Laterality: N/A;   • ENDOSCOPY N/A 3/19/2022    Procedure: ESOPHAGOGASTRODUODENOSCOPY, removal of food bolus, and balloon dilation;  Surgeon: Genia Ricketts MD;  Location: [facility] ENDOSCOPY;  Service: Gastroenterology;  Laterality: N/A;   • ESOPHAGUS SURGERY      Stent placed   • GASTROSTOMY FEEDING TUBE INSERTION     • HYSTERECTOMY  [id number]   • TOTAL KNEE ARTHROPLASTY  approx 2010       Family History:   Family History   Problem Relation Age of Onset   • Colon cancer Neg Hx        Social History:   Social History     Socioeconomic History   • Marital status:    Tobacco Use   • Smoking status: Never Smoker   • Smokeless tobacco: Never Used   Substance and Sexual Activity   • Alcohol use: No   • Drug use: No   • Sexual activity: Defer       Medications:     Current Outpatient Medications:   •  amLODIPine (NORVASC) 5 MG tablet, TAKE 1 TABLET (5MG) BY MOUTH EVERY DAY, Disp: 90 tablet, Rfl: 3  •  lisinopril (PRINIVIL,ZESTRIL) 20 MG tablet, 1 daily po, Disp: 30 tablet, Rfl: 2  •  omeprazole (priLOSEC) 40 MG capsule, Take 40 mg by mouth Daily., Disp: , Rfl:     Allergies:   Allergies   Allergen Reactions   • Atorvastatin Rash     Blisters in mouth   • Bactrim [Sulfamethoxazole-Trimethoprim] Itching and Rash   • Pantoprazole Itching     Itching and inability to sleep       Review of Systems:   Review of Systems   Constitutional: Negative for appetite change, fatigue, fever and unexpected weight loss.   HENT: Negative for trouble swallowing.    Gastrointestinal: Negative for abdominal distention, abdominal pain, anal bleeding, blood in stool, constipation,  "diarrhea, nausea, rectal pain, vomiting, GERD and indigestion.       The following portions of the patient's history were reviewed and updated as appropriate: allergies, current medications, past family history, past medical history, past social history, past surgical history and problem list.    Objective     Physical Exam:  Vital Signs:   Vitals:    04/12/22 1557   BP: 144/80   Temp: 97.3 °F (36.3 °C)   TempSrc: Infrared   Weight: 53.1 kg (117 lb)   Height: 157.5 cm (62\")       Physical Exam  Constitutional:       Appearance: Normal appearance.   HENT:      Head: Normocephalic and atraumatic.   Eyes:      Conjunctiva/sclera: Conjunctivae normal.   Abdominal:      General: Abdomen is flat. There is no distension.      Palpations: There is no mass.      Tenderness: There is no abdominal tenderness. There is no guarding or rebound.      Hernia: No hernia is present.   Musculoskeletal:      Cervical back: Normal range of motion and neck supple.   Neurological:      Mental Status: She is alert.         Results Review:   I reviewed the patient's new clinical results.    Admission on 03/19/2022, Discharged on 03/19/2022   Component Date Value Ref Range Status   • Extra Tube 03/19/2022 Hold for add-ons.   Final    Auto resulted.   • Extra Tube 03/19/2022 hold for add-on   Final    Auto resulted   • Extra Tube 03/19/2022 Hold for add-ons.   Final    Auto resulted.   • Extra Tube 03/19/2022 hold for add-on   Final    Auto resulted   • Glucose 03/19/2022 98  65 - 99 mg/dL Final   • BUN 03/19/2022 22  8 - 23 mg/dL Final   • Creatinine 03/19/2022 1.01 (A) 0.57 - 1.00 mg/dL Final   • Sodium 03/19/2022 142  136 - 145 mmol/L Final   • Potassium 03/19/2022 3.8  3.5 - 5.2 mmol/L Final   • Chloride 03/19/2022 105  98 - 107 mmol/L Final   • CO2 03/19/2022 20.5 (A) 22.0 - 29.0 mmol/L Final   • Calcium 03/19/2022 9.7  8.6 - 10.5 mg/dL Final   • Total Protein 03/19/2022 7.5  6.0 - 8.5 g/dL Final   • Albumin 03/19/2022 4.40  3.50 - 5.20 " g/dL Final   • ALT (SGPT) 03/19/2022 7  1 - 33 U/L Final   • AST (SGOT) 03/19/2022 16  1 - 32 U/L Final   • Alkaline Phosphatase 03/19/2022 63  39 - 117 U/L Final   • Total Bilirubin 03/19/2022 0.3  0.0 - 1.2 mg/dL Final   • Globulin 03/19/2022 3.1  gm/dL Final   • A/G Ratio 03/19/2022 1.4  g/dL Final   • BUN/Creatinine Ratio 03/19/2022 21.8  7.0 - 25.0 Final   • Anion Gap 03/19/2022 16.5 (A) 5.0 - 15.0 mmol/L Final   • eGFR 03/19/2022 55.0 (A) >60.0 mL/min/1.73 Final    National Kidney Foundation and American Society of Nephrology (ASN) Task Force recommended calculation based on the Chronic Kidney Disease Epidemiology Collaboration (CKD-EPI) equation refit without adjustment for race.   • WBC 03/19/2022 7.22  3.40 - 10.80 10*3/mm3 Final   • RBC 03/19/2022 4.21  3.77 - 5.28 10*6/mm3 Final   • Hemoglobin 03/19/2022 13.2  12.0 - 15.9 g/dL Final   • Hematocrit 03/19/2022 38.8  34.0 - 46.6 % Final   • MCV 03/19/2022 92.2  79.0 - 97.0 fL Final   • MCH 03/19/2022 31.4  26.6 - 33.0 pg Final   • MCHC 03/19/2022 34.0  31.5 - 35.7 g/dL Final   • RDW 03/19/2022 13.2  12.3 - 15.4 % Final   • RDW-SD 03/19/2022 44.8  37.0 - 54.0 fl Final   • MPV 03/19/2022 9.6  6.0 - 12.0 fL Final   • Platelets 03/19/2022 277  140 - 450 10*3/mm3 Final   • Neutrophil % 03/19/2022 75.4  42.7 - 76.0 % Final   • Lymphocyte % 03/19/2022 15.2 (A) 19.6 - 45.3 % Final   • Monocyte % 03/19/2022 8.0  5.0 - 12.0 % Final   • Eosinophil % 03/19/2022 0.4  0.3 - 6.2 % Final   • Basophil % 03/19/2022 0.6  0.0 - 1.5 % Final   • Immature Grans % 03/19/2022 0.4  0.0 - 0.5 % Final   • Neutrophils, Absolute 03/19/2022 5.44  1.70 - 7.00 10*3/mm3 Final   • Lymphocytes, Absolute 03/19/2022 1.10  0.70 - 3.10 10*3/mm3 Final   • Monocytes, Absolute 03/19/2022 0.58  0.10 - 0.90 10*3/mm3 Final   • Eosinophils, Absolute 03/19/2022 0.03  0.00 - 0.40 10*3/mm3 Final   • Basophils, Absolute 03/19/2022 0.04  0.00 - 0.20 10*3/mm3 Final   • Immature Grans, Absolute 03/19/2022 0.03   0.00 - 0.05 10*3/mm3 Final   • nRBC 03/19/2022 0.0  0.0 - 0.2 /100 WBC Final   • COVID19 03/19/2022 Not Detected  Not Detected - Ref. Range Final   • Glucose 03/19/2022 83  70 - 130 mg/dL Final    Serial Number: [id number]:  [id number]   • Case Report 03/19/2022    Final                    Value:Surgical Pathology Report                         Case: [id number]                                  Authorizing Provider:  Genia Ricketts MD  Collected:           03/19/2022 01:54 PM          Ordering Location:     [facility]    Received:            03/21/2022 09:08 AM                                 [facility]                                                                    Pathologist:           Alex Shaikh MD                                                     Specimen:    Esophagus, lower esophageal lesion, hx of esophageal carcinoma                            • Final Diagnosis 03/19/2022    Final                    Value:This result contains rich text formatting which cannot be displayed here.      XR Chest 1 View    Result Date: 3/19/2022  Minimal bibasilar opacity, favor atelectasis. Possible small left effusion.  This report was signed and finalized on 3/19/2022 11:56 AM by Angélica Baker MD.    3/19/2022  - Normal oropharynx.  - Food in the upper third of the esophagus. Removal was successful.  - Benign-appearing upper esophageal rings and web. Dilated.  - Z-line irregular, 35 cm from the incisors.  - Esophageal lersion was found in the lower third of the esophagus just above the GEJ concerning for neoplasia.  Biopsied.  - Erythematous mucosa in the posterior wall of the stomach and antrum.  - Normal duodenal bulb, first portion of the duodenum and second portion of the duodenum.  Assessment / Plan      1.  Esophageal dysphagia   2. Multiple upper esophageal web and stenosis with a prior history of multiple food bolus impaction and dilatation  3.  History of squamous cell  carcinoma esophagus status post chemoradiation 2019  4.  GE junction lesion  4/12/2022  Patient had an EGD done on 3/19/2022 for food bolus impaction.  She had a multiple upper and mid esophageal esophageal webs and rings which were dilated with balloon dilatation with a good breakdown of rings and webs.  She was also noted to have a back lesion at the GE junction which was biopsied however biopsy showed only squamous cells.  Gastric biopsy negative for H. pylori.  Endoscopically lesion was suspicious for neoplasia.  I have discussed the pathology report with her today.  Her lab work reviewed including CBC CMP were unremarkable.    Since the EGD dilatation patient does not have any swallowing issues now.  We will schedule her for an EGD for further biopsy and evaluation  We will keep her on a long-term Prilosec 40 mg p.o. daily    The indications, technique, alternatives and potential risk and complications were discussed with the patient including but not limited to bleeding, bowel perforations, missing lesions and anesthetic complications. The patient understands and wishes to proceed with the procedure and has given their verbal consent. Written patient education information was given to the patient.   The patient will call if they have further questions before procedure.       3/19/2022  Patient may have passed her food bolus down.  However at this time patient is not able to swallow any solids and even the liquids as per patient.  She is known to have a severe esophageal web/circumferential rings in the upper esophagus which was dilated multiple times.  Last EGD was in 2020, unable to pass the scope and scope was passed after savory dilatation to 10 to 12 mm.  She did not keep up the appointment to follow-up and further dilation. 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 Received only partial treatment with resolution  of tumor mass. Not followed since 2020.            Follow Up:   No follow-ups on file.    Genia Ricketts MD  [facility]  4/12/2022  16:02 EDT     Please note that portions of this note may have been completed with a voice recognition program.

## 2022-06-01 PROBLEM — K22.9 ESOPHAGEAL LESION: Status: ACTIVE | Noted: 2022-06-01

## 2023-03-24 ENCOUNTER — TRANSCRIBE ORDERS (OUTPATIENT)
Dept: ADMINISTRATIVE | Facility: HOSPITAL | Age: 88
End: 2023-03-24
Payer: MEDICARE

## 2023-03-24 DIAGNOSIS — I72.6 VERTEBRAL ARTERY ANEURYSM: Primary | ICD-10-CM

## 2023-04-28 ENCOUNTER — HOSPITAL ENCOUNTER (OUTPATIENT)
Dept: MRI IMAGING | Facility: HOSPITAL | Age: 88
Discharge: HOME | End: 2023-04-28
Payer: MEDICARE

## 2023-04-28 DIAGNOSIS — I72.6 VERTEBRAL ARTERY ANEURYSM: ICD-10-CM

## 2023-04-28 PROCEDURE — 70544 MR ANGIOGRAPHY HEAD W/O DYE: CPT

## 2023-09-05 ENCOUNTER — HOSPITAL ENCOUNTER (EMERGENCY)
Facility: HOSPITAL | Age: 88
Discharge: HOME | End: 2023-09-05
Attending: EMERGENCY MEDICINE
Payer: MEDICARE

## 2023-09-05 VITALS
RESPIRATION RATE: 18 BRPM | DIASTOLIC BLOOD PRESSURE: 76 MMHG | BODY MASS INDEX: 19.81 KG/M2 | WEIGHT: 116 LBS | OXYGEN SATURATION: 98 % | SYSTOLIC BLOOD PRESSURE: 160 MMHG | TEMPERATURE: 97.6 F | HEIGHT: 64 IN | HEART RATE: 85 BPM

## 2023-09-05 DIAGNOSIS — T14.8XXA ANIMAL SCRATCH: Primary | ICD-10-CM

## 2023-09-05 PROCEDURE — 99283 EMERGENCY DEPT VISIT LOW MDM: CPT

## 2023-09-05 PROCEDURE — 25010000002 TETANUS-DIPHTH-ACELL PERTUSSIS 5-2.5-18.5 LF-MCG/0.5 SUSPENSION PREFILLED SYRINGE: Performed by: PHYSICIAN ASSISTANT

## 2023-09-05 PROCEDURE — 90715 TDAP VACCINE 7 YRS/> IM: CPT | Performed by: PHYSICIAN ASSISTANT

## 2023-09-05 PROCEDURE — 90471 IMMUNIZATION ADMIN: CPT | Performed by: PHYSICIAN ASSISTANT

## 2023-09-05 RX ORDER — CEPHALEXIN 500 MG/1
500 CAPSULE ORAL 4 TIMES DAILY
Qty: 19 CAPSULE | Refills: 0 | Status: SHIPPED | OUTPATIENT
Start: 2023-09-05 | End: 2023-09-10

## 2023-09-05 RX ORDER — CEPHALEXIN 250 MG/1
500 CAPSULE ORAL ONCE
Status: COMPLETED | OUTPATIENT
Start: 2023-09-05 | End: 2023-09-05

## 2023-09-05 RX ADMIN — TETANUS TOXOID, REDUCED DIPHTHERIA TOXOID AND ACELLULAR PERTUSSIS VACCINE, ADSORBED 0.5 ML: 5; 2.5; 8; 8; 2.5 SUSPENSION INTRAMUSCULAR at 14:51

## 2023-09-05 RX ADMIN — CEPHALEXIN 500 MG: 250 CAPSULE ORAL at 14:52

## 2023-09-05 NOTE — DISCHARGE INSTRUCTIONS
Leave dermabond and steri strips in place as long as possible. Keep wounds clean and dry. Take antibiotics as prescribed. Follow up with your PCP in 1 week for wound recheck and further outpatient evaluation. Return to the ER for new or worsening symptoms or acute concerns.

## 2023-09-05 NOTE — ED PROVIDER NOTES
Subjective:  Chief Complaint:  Wound    History of Present Illness:  Patient is a 89-year-old female with history of difficulty hearing, hypertension, vertigo, esophageal cancer presenting to the ER with complaints of bilateral lacerations to dorsal wrists from a bunny rabbit.  She states she tried to pick it up and its back legs caught her.  She denies any animal bites.  She states her tetanus is not up-to-date.  She denies any additional symptoms or complaints at this time.      Nurses Notes reviewed and agree, including vitals, allergies, social history and prior medical history.     REVIEW OF SYSTEMS: All systems reviewed and not pertinent unless noted.  Review of Systems   Skin:  Positive for wound.   All other systems reviewed and are negative.    Past Medical History:   Diagnosis Date    Cancer     esophagus    Hard of hearing     History of blood transfusion     Hypertension     Vertigo        Allergies:    Atorvastatin, Bactrim [sulfamethoxazole-trimethoprim], and Pantoprazole      Past Surgical History:   Procedure Laterality Date    APPENDECTOMY  approx      SECTION      DENTAL PROCEDURE      ENDOSCOPY N/A 2019    Procedure: ESOPHAGOGASTRODUODENOSCOPY with dilation, removal foreign body and cold forcep biopsies;  Surgeon: Rajesh Schultz MD;  Location: [facility] ENDOSCOPY;  Service: Gastroenterology    ENDOSCOPY N/A 2020    Procedure: ESOPHAGOGASTRODUODENOSCOPY WITH DILATATION;  Surgeon: Genia Ricketts MD;  Location: [facility] ENDOSCOPY;  Service: Gastroenterology;  Laterality: N/A;    ENDOSCOPY N/A 3/19/2022    Procedure: ESOPHAGOGASTRODUODENOSCOPY, removal of food bolus, and balloon dilation;  Surgeon: Genia Ricketts MD;  Location: [facility] ENDOSCOPY;  Service: Gastroenterology;  Laterality: N/A;    ESOPHAGUS SURGERY      Stent placed    GASTROSTOMY FEEDING TUBE INSERTION      HYSTERECTOMY  [id number]    TOTAL KNEE ARTHROPLASTY  approx          Social History  "    Socioeconomic History    Marital status:    Tobacco Use    Smoking status: Never    Smokeless tobacco: Never   Substance and Sexual Activity    Alcohol use: No    Drug use: No    Sexual activity: Defer         Family History   Problem Relation Age of Onset    Colon cancer Neg Hx        Objective  Physical Exam:  /76   Pulse 85   Temp 97.6 °F (36.4 °C)   Resp 18   Ht 162.6 cm (64\")   Wt 52.6 kg (116 lb)   SpO2 98%   BMI 19.91 kg/m²      Physical Exam  Vitals and nursing note reviewed.   Constitutional:       General: She is not in acute distress.     Appearance: She is not toxic-appearing.   HENT:      Head: Normocephalic and atraumatic.      Right Ear: External ear normal.      Left Ear: External ear normal.      Nose: Nose normal.   Eyes:      Extraocular Movements: Extraocular movements intact.      Conjunctiva/sclera: Conjunctivae normal.   Pulmonary:      Effort: Pulmonary effort is normal. No respiratory distress.   Abdominal:      General: There is no distension.      Palpations: Abdomen is soft.   Musculoskeletal:         General: Normal range of motion.      Cervical back: Normal range of motion and neck supple.   Skin:     General: Skin is warm and dry.      Comments: Bilateral linear laceration/skin tear   Neurological:      General: No focal deficit present.      Mental Status: She is alert and oriented to person, place, and time.   Psychiatric:         Mood and Affect: Mood normal.         Behavior: Behavior normal.       Wound Care    Date/Time: 9/5/2023 3:22 PM  Performed by: Faith Fagan PA-C  Authorized by: Clarence Summers DO     Consent:     Consent obtained:  Verbal    Consent given by:  Patient    Risks, benefits, and alternatives were discussed: yes      Risks discussed:  Bleeding, infection and poor cosmetic result    Alternatives discussed:  No treatment  Universal protocol:     Patient identity confirmed:  Verbally with patient  Sedation:     Sedation type:  " None  Anesthesia:     Anesthesia method:  None  Procedure details:     Indications comment:  Skin tear    Wound location: bilateral dorsal wrist.    Wound age (days):  <1    Wound surface area (sq cm):  8    Debridement performed: No    Skin layer closed with:     Wound care performed:  Steri-strips placed (tissue adhesive used)  Dressing:     Wrapped with:  Elastic bandage 2 inch  Post-procedure details:     Procedure completion:  Tolerated well, no immediate complications    ED Course:         Lab Results (last 24 hours)       ** No results found for the last 24 hours. **             No radiology results from the last 24 hrs       MDM  Patient was evaluated in the ER for animal scratch from a bunny rabbit to bilateral dorsal wrist.  She is hemodynamically stable, afebrile, nontoxic-appearing on exam.  Differential diagnosis includes but not limited to skin tear, superficial laceration, complex laceration, among others.  Initial plan includes wound care, repair with dermabond and steri strips, update of tetanus, and antibiotic administration.    Tetanus was updated.  Wounds cleaned with chlorhexidine and sterile saline.  Wounds repaired with Dermabond and Steri-Strips.  Patient advised to follow-up with her PCP in 1 week for wound recheck.  Prescription given for Keflex with first dose given in the ER.  Precautions were given for return to the ER for any new or worsening symptoms.    Final diagnoses:   Animal scratch          Faith Fagan PA-C  09/05/23 1523

## 2024-03-02 ENCOUNTER — APPOINTMENT (OUTPATIENT)
Dept: CT IMAGING | Facility: HOSPITAL | Age: 89
End: 2024-03-02
Payer: MEDICARE

## 2024-03-02 ENCOUNTER — HOSPITAL ENCOUNTER (EMERGENCY)
Facility: HOSPITAL | Age: 89
Discharge: HOME | End: 2024-03-02
Attending: STUDENT IN AN ORGANIZED HEALTH CARE EDUCATION/TRAINING PROGRAM
Payer: MEDICARE

## 2024-03-02 VITALS
HEART RATE: 85 BPM | DIASTOLIC BLOOD PRESSURE: 71 MMHG | SYSTOLIC BLOOD PRESSURE: 117 MMHG | TEMPERATURE: 98.2 F | RESPIRATION RATE: 16 BRPM | BODY MASS INDEX: 19.63 KG/M2 | OXYGEN SATURATION: 95 % | HEIGHT: 64 IN | WEIGHT: 115 LBS

## 2024-03-02 DIAGNOSIS — S16.1XXA STRAIN OF NECK MUSCLE, INITIAL ENCOUNTER: Primary | ICD-10-CM

## 2024-03-02 LAB
ALBUMIN SERPL-MCNC: 4 G/DL (ref 3.5–5.2)
ALBUMIN/GLOB SERPL: 1.2 G/DL
ALP SERPL-CCNC: 72 U/L (ref 39–117)
ALT SERPL W P-5'-P-CCNC: 5 U/L (ref 1–33)
ANION GAP SERPL CALCULATED.3IONS-SCNC: 13 MMOL/L (ref 5–15)
AST SERPL-CCNC: 13 U/L (ref 1–32)
BASOPHILS # BLD AUTO: 0.04 10*3/MM3 (ref 0–0.2)
BASOPHILS NFR BLD AUTO: 0.5 % (ref 0–1.5)
BILIRUB SERPL-MCNC: 0.3 MG/DL (ref 0–1.2)
BUN SERPL-MCNC: 18 MG/DL (ref 8–23)
BUN/CREAT SERPL: 18 (ref 7–25)
CALCIUM SPEC-SCNC: 9 MG/DL (ref 8.6–10.5)
CHLORIDE SERPL-SCNC: 104 MMOL/L (ref 98–107)
CO2 SERPL-SCNC: 23 MMOL/L (ref 22–29)
CREAT SERPL-MCNC: 1 MG/DL (ref 0.57–1)
D-LACTATE SERPL-SCNC: 0.7 MMOL/L (ref 0.5–2)
DEPRECATED RDW RBC AUTO: 45.1 FL (ref 37–54)
EGFRCR SERPLBLD CKD-EPI 2021: 55 ML/MIN/1.73
EOSINOPHIL # BLD AUTO: 0.08 10*3/MM3 (ref 0–0.4)
EOSINOPHIL NFR BLD AUTO: 1 % (ref 0.3–6.2)
ERYTHROCYTE [DISTWIDTH] IN BLOOD BY AUTOMATED COUNT: 13.5 % (ref 12.3–15.4)
GLOBULIN UR ELPH-MCNC: 3.4 GM/DL
GLUCOSE SERPL-MCNC: 128 MG/DL (ref 65–99)
HCT VFR BLD AUTO: 36.7 % (ref 34–46.6)
HGB BLD-MCNC: 12.4 G/DL (ref 12–15.9)
IMM GRANULOCYTES # BLD AUTO: 0.02 10*3/MM3 (ref 0–0.05)
IMM GRANULOCYTES NFR BLD AUTO: 0.3 % (ref 0–0.5)
LYMPHOCYTES # BLD AUTO: 1.49 10*3/MM3 (ref 0.7–3.1)
LYMPHOCYTES NFR BLD AUTO: 19.5 % (ref 19.6–45.3)
MCH RBC QN AUTO: 30.2 PG (ref 26.6–33)
MCHC RBC AUTO-ENTMCNC: 33.8 G/DL (ref 31.5–35.7)
MCV RBC AUTO: 89.5 FL (ref 79–97)
MONOCYTES # BLD AUTO: 0.71 10*3/MM3 (ref 0.1–0.9)
MONOCYTES NFR BLD AUTO: 9.3 % (ref 5–12)
NEUTROPHILS NFR BLD AUTO: 5.3 10*3/MM3 (ref 1.7–7)
NEUTROPHILS NFR BLD AUTO: 69.4 % (ref 42.7–76)
NRBC BLD AUTO-RTO: 0 /100 WBC (ref 0–0.2)
PLATELET # BLD AUTO: 250 10*3/MM3 (ref 140–450)
PMV BLD AUTO: 9.9 FL (ref 6–12)
POTASSIUM SERPL-SCNC: 3.8 MMOL/L (ref 3.5–5.2)
PROCALCITONIN SERPL-MCNC: 0.08 NG/ML (ref 0–0.25)
PROT SERPL-MCNC: 7.4 G/DL (ref 6–8.5)
RBC # BLD AUTO: 4.1 10*6/MM3 (ref 3.77–5.28)
SODIUM SERPL-SCNC: 140 MMOL/L (ref 136–145)
WBC NRBC COR # BLD AUTO: 7.64 10*3/MM3 (ref 3.4–10.8)

## 2024-03-02 PROCEDURE — 25010000002 DEXAMETHASONE SODIUM PHOSPHATE 10 MG/ML SOLUTION

## 2024-03-02 PROCEDURE — 84145 PROCALCITONIN (PCT): CPT | Performed by: NURSE PRACTITIONER

## 2024-03-02 PROCEDURE — 99284 EMERGENCY DEPT VISIT MOD MDM: CPT

## 2024-03-02 PROCEDURE — 96374 THER/PROPH/DIAG INJ IV PUSH: CPT

## 2024-03-02 PROCEDURE — 83605 ASSAY OF LACTIC ACID: CPT | Performed by: NURSE PRACTITIONER

## 2024-03-02 PROCEDURE — 85025 COMPLETE CBC W/AUTO DIFF WBC: CPT | Performed by: NURSE PRACTITIONER

## 2024-03-02 PROCEDURE — 96375 TX/PRO/DX INJ NEW DRUG ADDON: CPT

## 2024-03-02 PROCEDURE — 36415 COLL VENOUS BLD VENIPUNCTURE: CPT

## 2024-03-02 PROCEDURE — 80053 COMPREHEN METABOLIC PANEL: CPT | Performed by: NURSE PRACTITIONER

## 2024-03-02 PROCEDURE — 25010000002 KETOROLAC TROMETHAMINE PER 15 MG: Performed by: NURSE PRACTITIONER

## 2024-03-02 PROCEDURE — 72125 CT NECK SPINE W/O DYE: CPT

## 2024-03-02 RX ORDER — DEXAMETHASONE SODIUM PHOSPHATE 10 MG/ML
10 INJECTION, SOLUTION INTRAMUSCULAR; INTRAVENOUS ONCE
Status: COMPLETED | OUTPATIENT
Start: 2024-03-02 | End: 2024-03-02

## 2024-03-02 RX ORDER — METHOCARBAMOL 750 MG/1
750 TABLET, FILM COATED ORAL ONCE
Status: COMPLETED | OUTPATIENT
Start: 2024-03-02 | End: 2024-03-02

## 2024-03-02 RX ORDER — KETOROLAC TROMETHAMINE 30 MG/ML
15 INJECTION, SOLUTION INTRAMUSCULAR; INTRAVENOUS ONCE
Status: COMPLETED | OUTPATIENT
Start: 2024-03-02 | End: 2024-03-02

## 2024-03-02 RX ORDER — DEXAMETHASONE SODIUM PHOSPHATE 10 MG/ML
INJECTION, SOLUTION INTRAMUSCULAR; INTRAVENOUS
Status: COMPLETED
Start: 2024-03-02 | End: 2024-03-02

## 2024-03-02 RX ORDER — AMOXICILLIN AND CLAVULANATE POTASSIUM 400; 57 MG/5ML; MG/5ML
POWDER, FOR SUSPENSION ORAL
COMMUNITY
Start: 2024-02-27

## 2024-03-02 RX ORDER — METHOCARBAMOL 750 MG/1
750 TABLET, FILM COATED ORAL 3 TIMES DAILY PRN
Qty: 15 TABLET | Refills: 0 | Status: SHIPPED | OUTPATIENT
Start: 2024-03-02 | End: 2024-03-07

## 2024-03-02 RX ADMIN — DEXAMETHASONE SODIUM PHOSPHATE 10 MG: 10 INJECTION, SOLUTION INTRAMUSCULAR; INTRAVENOUS at 02:14

## 2024-03-02 RX ADMIN — KETOROLAC TROMETHAMINE 15 MG: 30 INJECTION, SOLUTION INTRAMUSCULAR; INTRAVENOUS at 00:47

## 2024-03-02 RX ADMIN — METHOCARBAMOL 750 MG: 750 TABLET, FILM COATED ORAL at 03:03

## 2024-03-02 NOTE — ED PROVIDER NOTES
Subjective:  History of Present Illness:    Patient is a 88-year-old female without contributing health history.  Presents to the ER today with neck pain that started 2024.  She reports that neck pain started gradually and has gotten worse over that time period.  Today reports that she is barely any range of motion with rotation or forward flexion.  She denies fever.  Reports that she was treated recently for a sinus infection.  Is currently on amoxicillin.  Denies OTC medication home remedy denies alleviating exacerbating factors.    Nurses Notes reviewed and agree, including vitals, allergies, social history and prior medical history.     REVIEW OF SYSTEMS: All systems reviewed and not pertinent unless noted.  Review of Systems   Musculoskeletal:  Positive for arthralgias.   All other systems reviewed and are negative.      Past Medical History:   Diagnosis Date    Cancer     esophagus    Hard of hearing     History of blood transfusion     Hypertension     Vertigo        Allergies:    Atorvastatin, Bactrim [sulfamethoxazole-trimethoprim], and Pantoprazole      Past Surgical History:   Procedure Laterality Date    APPENDECTOMY  approx 6     SECTION      DENTAL PROCEDURE      ENDOSCOPY N/A 2019    Procedure: ESOPHAGOGASTRODUODENOSCOPY with dilation, removal foreign body and cold forcep biopsies;  Surgeon: Rajesh Schultz MD;  Location: [facility] ENDOSCOPY;  Service: Gastroenterology    ENDOSCOPY N/A 2020    Procedure: ESOPHAGOGASTRODUODENOSCOPY WITH DILATATION;  Surgeon: Genia Ricketts MD;  Location: [facility] ENDOSCOPY;  Service: Gastroenterology;  Laterality: N/A;    ENDOSCOPY N/A 3/19/2022    Procedure: ESOPHAGOGASTRODUODENOSCOPY, removal of food bolus, and balloon dilation;  Surgeon: Genia Ricketts MD;  Location: [facility] ENDOSCOPY;  Service: Gastroenterology;  Laterality: N/A;    ESOPHAGUS SURGERY      Stent placed    GASTROSTOMY FEEDING TUBE INSERTION      HYSTERECTOMY   [id number]    TOTAL KNEE ARTHROPLASTY  approx 2010         Social History     Socioeconomic History    Marital status:    Tobacco Use    Smoking status: Never    Smokeless tobacco: Never   Vaping Use    Vaping status: Never Used   Substance and Sexual Activity    Alcohol use: No    Drug use: No    Sexual activity: Defer         Family History   Problem Relation Age of Onset    Colon cancer Neg Hx        Objective  Physical Exam:  /81 (BP Location: Left arm, Patient Position: Lying)   Pulse 76   Temp 98.4 °F (36.9 °C) (Oral)   Resp 14   Ht 162.6 cm (64\")   Wt 52.2 kg (115 lb)   SpO2 100%   BMI 19.74 kg/m²      Physical Exam  Vitals and nursing note reviewed.   Constitutional:       Appearance: Normal appearance. She is normal weight.   HENT:      Head: Normocephalic and atraumatic.      Nose: Nose normal.      Mouth/Throat:      Mouth: Mucous membranes are moist.      Pharynx: Oropharynx is clear.   Eyes:      Extraocular Movements: Extraocular movements intact.      Conjunctiva/sclera: Conjunctivae normal.      Pupils: Pupils are equal, round, and reactive to light.   Cardiovascular:      Rate and Rhythm: Normal rate and regular rhythm.   Pulmonary:      Effort: Pulmonary effort is normal.      Breath sounds: Normal breath sounds.   Abdominal:      General: Abdomen is flat. Bowel sounds are normal.      Palpations: Abdomen is soft.   Musculoskeletal:      Cervical back: Normal range of motion and neck supple.      Comments: Patient has decreased range of motion in neck.   Skin:     General: Skin is warm.      Capillary Refill: Capillary refill takes less than 2 seconds.   Neurological:      General: No focal deficit present.      Mental Status: She is alert and oriented to person, place, and time. Mental status is at baseline.   Psychiatric:         Mood and Affect: Mood normal.         Behavior: Behavior normal.         Thought Content: Thought content normal.         Judgment: Judgment normal. "         Procedures    ED Course:         Lab Results (last 24 hours)       Procedure Component Value Units Date/Time    CBC Auto Differential [[id number]]  (Abnormal) Collected: 03/02/24 0044    Specimen: Blood Updated: 03/02/24 0220     WBC 7.64 10*3/mm3      RBC 4.10 10*6/mm3      Hemoglobin 12.4 g/dL      Hematocrit 36.7 %      MCV 89.5 fL      MCH 30.2 pg      MCHC 33.8 g/dL      RDW 13.5 %      RDW-SD 45.1 fl      MPV 9.9 fL      Platelets 250 10*3/mm3      Neutrophil % 69.4 %      Lymphocyte % 19.5 %      Monocyte % 9.3 %      Eosinophil % 1.0 %      Basophil % 0.5 %      Immature Grans % 0.3 %      Neutrophils, Absolute 5.30 10*3/mm3      Lymphocytes, Absolute 1.49 10*3/mm3      Monocytes, Absolute 0.71 10*3/mm3      Eosinophils, Absolute 0.08 10*3/mm3      Basophils, Absolute 0.04 10*3/mm3      Immature Grans, Absolute 0.02 10*3/mm3      nRBC 0.0 /100 WBC     Comprehensive Metabolic Panel [[id number]]  (Abnormal) Collected: 03/02/24 0044    Specimen: Blood Updated: 03/02/24 0236     Glucose 128 mg/dL      BUN 18 mg/dL      Creatinine 1.00 mg/dL      Sodium 140 mmol/L      Potassium 3.8 mmol/L      Chloride 104 mmol/L      CO2 23.0 mmol/L      Calcium 9.0 mg/dL      Total Protein 7.4 g/dL      Albumin 4.0 g/dL      ALT (SGPT) 5 U/L      AST (SGOT) 13 U/L      Alkaline Phosphatase 72 U/L      Total Bilirubin 0.3 mg/dL      Globulin 3.4 gm/dL      A/G Ratio 1.2 g/dL      BUN/Creatinine Ratio 18.0     Anion Gap 13.0 mmol/L      eGFR 55.0 mL/min/1.73     Narrative:      GFR Normal >60  Chronic Kidney Disease <60  Kidney Failure <15    The GFR formula is only valid for adults with stable renal function between ages 18 and 70.    Procalcitonin [[id number]] Collected: 03/02/24 0044    Specimen: Blood Updated: 03/02/24 0222    Lactic Acid, Plasma [[id number]]  (Normal) Collected: 03/02/24 0044    Specimen: Blood Updated: 03/02/24 0236     Lactate 0.7 mmol/L              CT Cervical Spine Without Contrast    Result Date:  3/2/2024  FINAL REPORT TECHNIQUE: null CLINICAL HISTORY: Posterior Neck pain x 1 week, headache COMPARISON: null FINDINGS: CT cervical spine without contrast Comparison: None Findings: No acute fractures or dislocations. Vertebral alignment is within normal limits. Multilevel facet and uncovertebral joint arthropathy. At C3-C4: Mild neural foraminal stenosis on the left due to facet hypertrophy. At C5-C6: Mild neural foraminal stenosis bilaterally due to facet and uncovertebral joint arthropathy. Posterior disc osteophyte with mild canal stenosis. No cervical fluid collections or masses. Thyroid gland unremarkable. Biapical thickening Visualized intracranial contents are unremarkable.     Impression: IMPRESSION: 1. No acute fracture of the cervical spine. 2. Multilevel facet and uncovertebral joint arthropathy. 3. No high-grade central canal stenosis. Regions of neural foraminal stenosis as above. Authenticated and Electronically Signed by Tim Mahoney DO on 03/02/2024 01:50:25 AM        MDM      Initial impression of presenting illness: Patient is a 88-year-old female without contributing health history.  Presents to the ER today with neck pain that started 2/26/2024.  She reports that neck pain started gradually and has gotten worse over that time period.  Today reports that she is barely any range of motion with rotation or forward flexion.  She denies fever.  Reports that she was treated recently for a sinus infection.  Is currently on amoxicillin.  Denies OTC medication home remedy denies alleviating exacerbating factors.    DDX: includes but is not limited to: Muscle strain, sprain, fracture or other    Patient arrives stable with vitals interpreted by myself.     Pertinent features from physical exam: There is decreased range of motion in neck with forward flexion and right and left rotation..    Initial diagnostic plan: CBC, CMP, Pro-Cesario, lactic acid    Results from initial plan were reviewed and  interpreted by me revealing CBC, CMP, lactic acid are all negative.  CT cervical spine with the following impression #1 no acute fracture of the cervical spine.  #2 multilevel facet and uncovertebral joint arthropathy. #3 no high-grade central canal stenosis.  Regions of neuroforaminal stenosis as above.  Procalcitonin is pending.    Diagnostic information from other sources: Chart review    Interventions / Re-evaluation: Stable throughout encounter    Results/clinical rationale were discussed with patient    Consultations/Discussion of results with other physicians: N/A    Disposition plan: Patient is hemodynamically stable nontoxic-appearing appropriate discharge.  Will send patient home on methocarbamol.  Patient to follow-up PCP in 1 week.  Follow-up ER for new or worse symptoms.  -----        Final diagnoses:   Strain of neck muscle, initial encounter          Juancarlos Marie, APRN  03/02/24 0237

## 2024-03-02 NOTE — DISCHARGE INSTRUCTIONS
Please follow-up with your PCP in 1 week.  Follow-up with ER for new or worse symptoms.  I have called in a prescription for muscle relaxer.

## 2024-09-25 ENCOUNTER — APPOINTMENT (OUTPATIENT)
Dept: CT IMAGING | Facility: HOSPITAL | Age: 89
End: 2024-09-25
Payer: MEDICARE

## 2024-09-25 ENCOUNTER — HOSPITAL ENCOUNTER (EMERGENCY)
Facility: HOSPITAL | Age: 89
Discharge: HOME | End: 2024-09-25
Attending: STUDENT IN AN ORGANIZED HEALTH CARE EDUCATION/TRAINING PROGRAM
Payer: MEDICARE

## 2024-09-25 VITALS
DIASTOLIC BLOOD PRESSURE: 87 MMHG | WEIGHT: 115.08 LBS | SYSTOLIC BLOOD PRESSURE: 154 MMHG | HEART RATE: 114 BPM | HEIGHT: 64 IN | OXYGEN SATURATION: 96 % | BODY MASS INDEX: 19.65 KG/M2 | TEMPERATURE: 97.6 F | RESPIRATION RATE: 16 BRPM

## 2024-09-25 DIAGNOSIS — N12 PYELONEPHRITIS: Primary | ICD-10-CM

## 2024-09-25 LAB
ALBUMIN SERPL-MCNC: 3.9 G/DL (ref 3.5–5.2)
ALBUMIN/GLOB SERPL: 1.1 G/DL
ALP SERPL-CCNC: 82 U/L (ref 39–117)
ALT SERPL W P-5'-P-CCNC: <5 U/L (ref 1–33)
ANION GAP SERPL CALCULATED.3IONS-SCNC: 15.4 MMOL/L (ref 5–15)
AST SERPL-CCNC: 14 U/L (ref 1–32)
BACTERIA UR QL AUTO: ABNORMAL /HPF
BASOPHILS # BLD AUTO: 0.05 10*3/MM3 (ref 0–0.2)
BASOPHILS NFR BLD AUTO: 0.7 % (ref 0–1.5)
BILIRUB SERPL-MCNC: 0.3 MG/DL (ref 0–1.2)
BILIRUB UR QL STRIP: NEGATIVE
BUN SERPL-MCNC: 18 MG/DL (ref 8–23)
BUN/CREAT SERPL: 17.8 (ref 7–25)
CALCIUM SPEC-SCNC: 9.8 MG/DL (ref 8.6–10.5)
CHLORIDE SERPL-SCNC: 101 MMOL/L (ref 98–107)
CLARITY UR: ABNORMAL
CO2 SERPL-SCNC: 23.6 MMOL/L (ref 22–29)
COLOR UR: YELLOW
CREAT SERPL-MCNC: 1.01 MG/DL (ref 0.57–1)
DEPRECATED RDW RBC AUTO: 45.7 FL (ref 37–54)
EGFRCR SERPLBLD CKD-EPI 2021: 54 ML/MIN/1.73
EOSINOPHIL # BLD AUTO: 0.08 10*3/MM3 (ref 0–0.4)
EOSINOPHIL NFR BLD AUTO: 1.1 % (ref 0.3–6.2)
ERYTHROCYTE [DISTWIDTH] IN BLOOD BY AUTOMATED COUNT: 13.4 % (ref 12.3–15.4)
GLOBULIN UR ELPH-MCNC: 3.4 GM/DL
GLUCOSE SERPL-MCNC: 122 MG/DL (ref 65–99)
GLUCOSE UR STRIP-MCNC: NEGATIVE MG/DL
HCT VFR BLD AUTO: 36.2 % (ref 34–46.6)
HGB BLD-MCNC: 11.7 G/DL (ref 12–15.9)
HGB UR QL STRIP.AUTO: ABNORMAL
HYALINE CASTS UR QL AUTO: ABNORMAL /LPF
IMM GRANULOCYTES # BLD AUTO: 0.02 10*3/MM3 (ref 0–0.05)
IMM GRANULOCYTES NFR BLD AUTO: 0.3 % (ref 0–0.5)
KETONES UR QL STRIP: ABNORMAL
LEUKOCYTE ESTERASE UR QL STRIP.AUTO: ABNORMAL
LIPASE SERPL-CCNC: 36 U/L (ref 13–60)
LYMPHOCYTES # BLD AUTO: 1.26 10*3/MM3 (ref 0.7–3.1)
LYMPHOCYTES NFR BLD AUTO: 17.9 % (ref 19.6–45.3)
MCH RBC QN AUTO: 29.5 PG (ref 26.6–33)
MCHC RBC AUTO-ENTMCNC: 32.3 G/DL (ref 31.5–35.7)
MCV RBC AUTO: 91.4 FL (ref 79–97)
MONOCYTES # BLD AUTO: 0.64 10*3/MM3 (ref 0.1–0.9)
MONOCYTES NFR BLD AUTO: 9.1 % (ref 5–12)
NEUTROPHILS NFR BLD AUTO: 4.97 10*3/MM3 (ref 1.7–7)
NEUTROPHILS NFR BLD AUTO: 70.9 % (ref 42.7–76)
NITRITE UR QL STRIP: POSITIVE
NRBC BLD AUTO-RTO: 0 /100 WBC (ref 0–0.2)
PH UR STRIP.AUTO: 6.5 [PH] (ref 5–8)
PLATELET # BLD AUTO: 285 10*3/MM3 (ref 140–450)
PMV BLD AUTO: 9.3 FL (ref 6–12)
POTASSIUM SERPL-SCNC: 4.2 MMOL/L (ref 3.5–5.2)
PROT SERPL-MCNC: 7.3 G/DL (ref 6–8.5)
PROT UR QL STRIP: NEGATIVE
RBC # BLD AUTO: 3.96 10*6/MM3 (ref 3.77–5.28)
RBC # UR STRIP: ABNORMAL /HPF
REF LAB TEST METHOD: ABNORMAL
SODIUM SERPL-SCNC: 140 MMOL/L (ref 136–145)
SP GR UR STRIP: 1.01 (ref 1–1.03)
SQUAMOUS #/AREA URNS HPF: ABNORMAL /HPF
UROBILINOGEN UR QL STRIP: ABNORMAL
WBC # UR STRIP: ABNORMAL /HPF
WBC NRBC COR # BLD AUTO: 7.02 10*3/MM3 (ref 3.4–10.8)

## 2024-09-25 PROCEDURE — 96375 TX/PRO/DX INJ NEW DRUG ADDON: CPT

## 2024-09-25 PROCEDURE — 72131 CT LUMBAR SPINE W/O DYE: CPT

## 2024-09-25 PROCEDURE — 81001 URINALYSIS AUTO W/SCOPE: CPT | Performed by: PHYSICIAN ASSISTANT

## 2024-09-25 PROCEDURE — 74177 CT ABD & PELVIS W/CONTRAST: CPT

## 2024-09-25 PROCEDURE — 25010000002 MORPHINE PER 10 MG: Performed by: STUDENT IN AN ORGANIZED HEALTH CARE EDUCATION/TRAINING PROGRAM

## 2024-09-25 PROCEDURE — 83690 ASSAY OF LIPASE: CPT | Performed by: STUDENT IN AN ORGANIZED HEALTH CARE EDUCATION/TRAINING PROGRAM

## 2024-09-25 PROCEDURE — 99285 EMERGENCY DEPT VISIT HI MDM: CPT

## 2024-09-25 PROCEDURE — 96374 THER/PROPH/DIAG INJ IV PUSH: CPT

## 2024-09-25 PROCEDURE — 25510000001 IOPAMIDOL 61 % SOLUTION: Performed by: STUDENT IN AN ORGANIZED HEALTH CARE EDUCATION/TRAINING PROGRAM

## 2024-09-25 PROCEDURE — 80053 COMPREHEN METABOLIC PANEL: CPT | Performed by: PHYSICIAN ASSISTANT

## 2024-09-25 PROCEDURE — 85025 COMPLETE CBC W/AUTO DIFF WBC: CPT | Performed by: PHYSICIAN ASSISTANT

## 2024-09-25 PROCEDURE — 25010000002 ONDANSETRON PER 1 MG: Performed by: PHYSICIAN ASSISTANT

## 2024-09-25 RX ORDER — IOPAMIDOL 612 MG/ML
100 INJECTION, SOLUTION INTRAVASCULAR
Status: COMPLETED | OUTPATIENT
Start: 2024-09-25 | End: 2024-09-25

## 2024-09-25 RX ORDER — CEFDINIR 300 MG/1
300 CAPSULE ORAL 2 TIMES DAILY
Qty: 14 CAPSULE | Refills: 0 | Status: SHIPPED | OUTPATIENT
Start: 2024-09-25

## 2024-09-25 RX ORDER — HYDROCODONE BITARTRATE AND ACETAMINOPHEN 5; 325 MG/1; MG/1
1 TABLET ORAL EVERY 6 HOURS PRN
COMMUNITY

## 2024-09-25 RX ORDER — ONDANSETRON 2 MG/ML
4 INJECTION INTRAMUSCULAR; INTRAVENOUS ONCE
Status: COMPLETED | OUTPATIENT
Start: 2024-09-25 | End: 2024-09-25

## 2024-09-25 RX ORDER — SODIUM CHLORIDE 0.9 % (FLUSH) 0.9 %
10 SYRINGE (ML) INJECTION AS NEEDED
Status: DISCONTINUED | OUTPATIENT
Start: 2024-09-25 | End: 2024-09-25 | Stop reason: HOSPADM

## 2024-09-25 RX ORDER — MORPHINE SULFATE 2 MG/ML
2 INJECTION, SOLUTION INTRAMUSCULAR; INTRAVENOUS ONCE
Status: COMPLETED | OUTPATIENT
Start: 2024-09-25 | End: 2024-09-25

## 2024-09-25 RX ADMIN — IOPAMIDOL 100 ML: 612 INJECTION, SOLUTION INTRAVENOUS at 10:28

## 2024-09-25 RX ADMIN — ONDANSETRON 4 MG: 2 INJECTION INTRAMUSCULAR; INTRAVENOUS at 09:22

## 2024-09-25 RX ADMIN — MORPHINE SULFATE 2 MG: 2 INJECTION, SOLUTION INTRAMUSCULAR; INTRAVENOUS at 09:21

## 2024-09-25 NOTE — DISCHARGE INSTRUCTIONS
Take antibiotic as prescribed to treat infection to urine and right kidney.  Recommend close outpatient follow-up with your primary care provider and urology.  Call office to schedule an appointment.  Return to ED if worsening symptoms or concerns.

## 2024-09-25 NOTE — ED PROVIDER NOTES
Subjective   History of Present Illness  89-year-old male PMH HTN, vertigo, esophageal cancer presents to ED for evaluation of back pain.  Patient states that she has had increased pain to her right flank over the last 2 weeks.  Worse with movement and ambulation.  States that she has been unable to leave her home secondary to the pain.  Notes that she has some chronic back pain for many years but this is increased in nature over the last 2 weeks.  She is prescribed hydrocodone however she does not routinely take this medication as she does not like how it makes her feel per patient.  She did take a half a tablet yesterday with mild improvement in symptoms.  She denies any focal weakness, numbness, paresthesias, saddle anesthesias, loss of bowel or bladder.  No fevers, chills, dysuria, hematuria.  Review of Systems   Constitutional:  Negative for chills and fever.   Genitourinary:  Positive for flank pain. Negative for dysuria, frequency and hematuria.   Musculoskeletal:  Positive for back pain.   All other systems reviewed and are negative.      Past Medical History:   Diagnosis Date    Cancer     esophagus    Hard of hearing     History of blood transfusion     Hypertension     Vertigo        Allergies   Allergen Reactions    Atorvastatin Rash     Blisters in mouth    Bactrim [Sulfamethoxazole-Trimethoprim] Itching and Rash    Pantoprazole Itching     Itching and inability to sleep       Past Surgical History:   Procedure Laterality Date    APPENDECTOMY  approx 1956     SECTION  194    DENTAL PROCEDURE      ENDOSCOPY N/A 2019    Procedure: ESOPHAGOGASTRODUODENOSCOPY with dilation, removal foreign body and cold forcep biopsies;  Surgeon: Rajesh Schultz MD;  Location: [facility] ENDOSCOPY;  Service: Gastroenterology    ENDOSCOPY N/A 2020    Procedure: ESOPHAGOGASTRODUODENOSCOPY WITH DILATATION;  Surgeon: Genia Ricketts MD;  Location: [facility] ENDOSCOPY;  Service: Gastroenterology;  Laterality:  N/A;    ENDOSCOPY N/A 3/19/2022    Procedure: ESOPHAGOGASTRODUODENOSCOPY, removal of food bolus, and balloon dilation;  Surgeon: Genia Ricketts MD;  Location: [facility] ENDOSCOPY;  Service: Gastroenterology;  Laterality: N/A;    ESOPHAGUS SURGERY      Stent placed    GASTROSTOMY FEEDING TUBE INSERTION      HYSTERECTOMY  [id number]    TOTAL KNEE ARTHROPLASTY  approx 2010       Family History   Problem Relation Age of Onset    Colon cancer Neg Hx        Social History     Socioeconomic History    Marital status:    Tobacco Use    Smoking status: Never    Smokeless tobacco: Never   Vaping Use    Vaping status: Never Used   Substance and Sexual Activity    Alcohol use: No    Drug use: No    Sexual activity: Defer           Objective   Physical Exam  Vitals and nursing note reviewed.   Constitutional:       General: She is not in acute distress.  HENT:      Head: Normocephalic and atraumatic.   Cardiovascular:      Rate and Rhythm: Normal rate and regular rhythm.   Pulmonary:      Effort: Pulmonary effort is normal. No respiratory distress.      Breath sounds: Normal breath sounds.   Abdominal:      Palpations: Abdomen is soft.      Tenderness: There is no abdominal tenderness. There is no guarding or rebound.   Musculoskeletal:      Comments: Mild tenderness noted to distal lumbar spine extending to right flank.     Skin:     General: Skin is warm and dry.   Neurological:      Mental Status: She is alert.      Sensory: No sensory deficit.      Motor: No weakness.      Comments: Awake and alert   Psychiatric:         Mood and Affect: Mood normal.         Behavior: Behavior normal.         Procedures           ED Course  89-year-old female presents to ED for evaluation of back pain.  Refer to HPI for further details.  Differential includes acute on chronic exacerbation of back pain, kidney stone, UTI, pyelonephritis, rupture of abdominal aortic aneurysm, fracture, sprain.  Patient with some diffuse tenderness  to distal lumbar spine extending to the right flank region.  Extremities are neurovascularly intact.  Patient does have nitrite positive urinary tract infection with 4+ bacteria and too numerous to count WBCs.  There is some contamination of sample however given nitrite positive do suspect that this is a true infection.  Lumbar imaging appreciates degenerative changes without acute findings.  CT abdomen pelvis with IV contrast appreciates ircumscribed hypodense lesion in the inferior pole  of the right kidney which is consistent with patient's site of pain.  Possibly secondary to pyelonephritis.  Also noted enhancing of gallbladder wall however patient is abdomen is soft and nontender, no right upper quadrant tenderness or pericholecystic fluid to suggest acute cholecystitis.  Upon reevaluation of patient she reports significant improvement in pain following 2 mg IV morphine.  Will discharge home on p.o. cefdinir with close outpatient follow-up with PCP and urology referral.  Case discussed with attending, Dr. Arredondo, who was agreeable with plan of care.  Return precautions given for worsening symptoms or concerns.    Medical Decision Making  Problems Addressed:  Pyelonephritis: complicated acute illness or injury    Amount and/or Complexity of Data Reviewed  Labs: ordered. Decision-making details documented in ED Course.  Radiology: ordered.    Risk  Prescription drug management.        Final diagnoses:   Pyelonephritis       ED Disposition  ED Disposition       ED Disposition   Discharge    Condition   Stable    Comment   --               Liyah Sheth MD  [address]  [address]  [facility] [place] [address] [phone]    Schedule an appointment as soon as possible for a visit       Antonella Moore DO  [address] [address]  [phone]          [facility] EMERGENCY DEPARTMENT  [address] [address] [phone]  Go to   If symptoms  worsen         Medication List        New Prescriptions      cefdinir 300 MG capsule  Commonly known as: OMNICEF  Take 1 capsule by mouth 2 (Two) Times a Day.               Where to Get Your Medications        These medications were sent to [facility] - [facility] - [address] - [phone] PH - [phone] FX  [address] [place] [address]      Phone: [phone]   cefdinir 300 MG capsule            Tete Romo PA-C  09/25/24 2912

## 2024-10-18 ENCOUNTER — TRANSCRIBE ORDERS (OUTPATIENT)
Dept: ULTRASOUND IMAGING | Facility: HOSPITAL | Age: 89
End: 2024-10-18
Payer: MEDICARE

## 2024-10-18 ENCOUNTER — HOSPITAL ENCOUNTER (OUTPATIENT)
Dept: ULTRASOUND IMAGING | Facility: HOSPITAL | Age: 89
Discharge: HOME | End: 2024-10-18
Payer: MEDICARE

## 2024-10-18 DIAGNOSIS — R10.11 RIGHT UPPER QUADRANT PAIN: Primary | ICD-10-CM

## 2024-10-18 DIAGNOSIS — R10.11 RIGHT UPPER QUADRANT PAIN: ICD-10-CM

## 2024-10-18 PROCEDURE — 76705 ECHO EXAM OF ABDOMEN: CPT

## 2024-10-21 ENCOUNTER — OFFICE VISIT (OUTPATIENT)
Dept: SURGERY | Facility: CLINIC | Age: 89
End: 2024-10-21
Payer: MEDICARE

## 2024-10-21 VITALS
SYSTOLIC BLOOD PRESSURE: 128 MMHG | TEMPERATURE: 98.2 F | DIASTOLIC BLOOD PRESSURE: 70 MMHG | RESPIRATION RATE: 18 BRPM | HEART RATE: 81 BPM | OXYGEN SATURATION: 98 % | WEIGHT: 100.4 LBS | BODY MASS INDEX: 17.14 KG/M2 | HEIGHT: 64 IN

## 2024-10-21 DIAGNOSIS — R10.9 RIGHT FLANK PAIN, CHRONIC: ICD-10-CM

## 2024-10-21 DIAGNOSIS — R10.11 RIGHT UPPER QUADRANT PAIN: Primary | ICD-10-CM

## 2024-10-21 DIAGNOSIS — G89.29 RIGHT FLANK PAIN, CHRONIC: ICD-10-CM

## 2024-10-21 DIAGNOSIS — E44.1 MILD PROTEIN-CALORIE MALNUTRITION: ICD-10-CM

## 2024-10-21 PROCEDURE — 1159F MED LIST DOCD IN RCRD: CPT | Performed by: STUDENT IN AN ORGANIZED HEALTH CARE EDUCATION/TRAINING PROGRAM

## 2024-10-21 PROCEDURE — 99204 OFFICE O/P NEW MOD 45 MIN: CPT | Performed by: STUDENT IN AN ORGANIZED HEALTH CARE EDUCATION/TRAINING PROGRAM

## 2024-10-21 PROCEDURE — 1160F RVW MEDS BY RX/DR IN RCRD: CPT | Performed by: STUDENT IN AN ORGANIZED HEALTH CARE EDUCATION/TRAINING PROGRAM

## 2024-10-21 NOTE — PROGRESS NOTES
Patient: Alberta Suarez    YOB: 1935    Date: 10/21/2024    Primary Care Provider: Antonella Moore DO    Chief Complaint   Patient presents with    Abdominal Pain     Right upper quadrant       SUBJECTIVE:    History of present illness:  The patient is in the office today for evaluation and treatment of right upper quadrant and flank pain that has been ongoing for 5-6 years. Patient states that it is there continuously and movement makes it worse. She denies particular foods worsen the pain. She states that she has lack of appetite and does not eat much. She reports some indigestion and nausea. Patient has constipation and only has a bowel movement every 2-3 days. She takes a daily stool softener. She was seen in the ED 9/25/24 for flank pain. A CT was done at that time that showed some gallbladder wall enhancement, but no stones, thickening, or surrounding fluid. Follow up GBS was recommended. Patient was also diagnosed with a UTI at that time. US abdomen performed on 10/18/24 did show contracted gallbladder without definite gallstones. No evidence of biliary obstruction. Patient has had this flank/hip pain for many years and has been diagnosed with arthritis and disk disease. She takes daily opiates for pain. Patient does also have a history of esophageal cancer and underwent radiation/chemotherapy with complete remission. She did have esophageal stenting and a PEG. She states that she had this right sided pain since prior to her cancer diagnosis.     The following portions of the patient's history were reviewed and updated as appropriate: allergies, current medications, past family history, past medical history, past social history, past surgical history and problem list.      Review of Systems   Constitutional:  Negative for chills, fever and unexpected weight change.   HENT:  Negative for hearing loss, trouble swallowing and voice change.    Eyes:  Negative for visual disturbance.    Respiratory:  Negative for apnea, cough, chest tightness, shortness of breath and wheezing.    Cardiovascular:  Negative for chest pain, palpitations and leg swelling.   Gastrointestinal:  Positive for abdominal pain and nausea. Negative for abdominal distention, anal bleeding, blood in stool, constipation, diarrhea, rectal pain and vomiting.   Endocrine: Negative for cold intolerance and heat intolerance.   Genitourinary:  Negative for difficulty urinating, dysuria and flank pain.   Musculoskeletal:  Negative for back pain and gait problem.   Skin:  Negative for color change, rash and wound.   Neurological:  Negative for dizziness, syncope, speech difficulty, weakness, light-headedness, numbness and headaches.   Hematological:  Negative for adenopathy. Does not bruise/bleed easily.   Psychiatric/Behavioral:  Negative for confusion. The patient is not nervous/anxious.          Allergies:  Allergies   Allergen Reactions    Atorvastatin Rash     Blisters in mouth    Bactrim [Sulfamethoxazole-Trimethoprim] Itching and Rash    Pantoprazole Itching     Itching and inability to sleep       Medications:    Current Outpatient Medications:     amLODIPine (NORVASC) 5 MG tablet, TAKE 1 TABLET (5MG) BY MOUTH EVERY DAY, Disp: 90 tablet, Rfl: 3    amoxicillin-clavulanate (AUGMENTIN) 400-57 MG/5ML suspension, Take 10 mL every 12 hours by oral route for 7 days., Disp: , Rfl:     HYDROcodone-acetaminophen (NORCO) 5-325 MG per tablet, Take 1 tablet by mouth Every 6 (Six) Hours As Needed for Moderate Pain., Disp: , Rfl:     lisinopril (PRINIVIL,ZESTRIL) 20 MG tablet, 1 daily po, Disp: 30 tablet, Rfl: 2    omeprazole (priLOSEC) 40 MG capsule, Take 1 capsule by mouth Daily., Disp: , Rfl:     cefdinir (OMNICEF) 300 MG capsule, Take 1 capsule by mouth 2 (Two) Times a Day. (Patient not taking: Reported on 10/21/2024), Disp: 14 capsule, Rfl: 0    History:  Past Medical History:   Diagnosis Date    Cancer     esophagus    Hard of hearing   "   History of blood transfusion     Hypertension     Vertigo        Past Surgical History:   Procedure Laterality Date    APPENDECTOMY  approx 195     SECTION  194    DENTAL PROCEDURE      ENDOSCOPY N/A 2019    Procedure: ESOPHAGOGASTRODUODENOSCOPY with dilation, removal foreign body and cold forcep biopsies;  Surgeon: Rajesh Schultz MD;  Location: [facility] ENDOSCOPY;  Service: Gastroenterology    ENDOSCOPY N/A 2020    Procedure: ESOPHAGOGASTRODUODENOSCOPY WITH DILATATION;  Surgeon: Genia Ricketts MD;  Location: [facility] ENDOSCOPY;  Service: Gastroenterology;  Laterality: N/A;    ENDOSCOPY N/A 3/19/2022    Procedure: ESOPHAGOGASTRODUODENOSCOPY, removal of food bolus, and balloon dilation;  Surgeon: Genia Ricketts MD;  Location: [facility] ENDOSCOPY;  Service: Gastroenterology;  Laterality: N/A;    ESOPHAGUS SURGERY      Stent placed    GASTROSTOMY FEEDING TUBE INSERTION      HYSTERECTOMY  [id number]    TOTAL KNEE ARTHROPLASTY  approx        Family History   Problem Relation Age of Onset    Colon cancer Neg Hx        Social History     Tobacco Use    Smoking status: Never    Smokeless tobacco: Never   Vaping Use    Vaping status: Never Used   Substance Use Topics    Alcohol use: No    Drug use: No        OBJECTIVE:    Vital Signs:   Vitals:    10/21/24 1301   BP: 128/70   Pulse: 81   Resp: 18   Temp: 98.2 °F (36.8 °C)   SpO2: 98%   Weight: 45.5 kg (100 lb 6.4 oz)   Height: 162.6 cm (64\")     Physical Exam:     General Appearance:    Alert, cooperative, cachectic, in no acute distress   Head:    Normocephalic, without obvious abnormality, atraumatic   Eyes:            Lids and lashes normal, conjunctivae and sclerae normal, no   icterus, no pallor, corneas clear, PERRLA   Throat:   No oral lesions, no thrush, oral mucosa moist   Lungs:     Clear to auscultation,respirations regular, even and                  unlabored    Heart:    Regular rhythm and normal rate, normal S1 and S2, no    "         murmur, no gallop, no rub, no click   Abdomen:    Scaphoid, soft, non-distended, mild tenderness to palpation greatest in right flank and back. Mild right upper quadrant pain with palpation. Negative Delacruz's sign.   Extremities:   Moves all extremities well, no edema, no cyanosis, no             redness   Pulses:   Pulses palpable and equal bilaterally   Skin:   No bleeding, bruising or rash   Neurologic:   Cranial nerves 2 - 12 grossly intact, sensation intact, DTR       present and equal bilaterally         Results Review:   I reviewed the patient's new clinical results.  I reviewed the patient's new imaging results and agree with the interpretation.    Review of Systems was reviewed and confirmed as accurate as documented by the MA.    ASSESSMENT/PLAN:    1. Right upper quadrant pain    2. Right flank pain, chronic    3. Mild protein-calorie malnutrition      Patient was referred to me for RUQ and flank pain that has been going on for many years. There was some concern by her PCP for gallbladder disease. I did review her CT abdomen and US and there is no evidence of cholelithiasis, wall thickening, or pericholecystic fluid. Her liver labs have all been within normal limits. Unsure of whether her complaint is referred from her right hip/back. Will order a HIDA scan to evaluate for biliary dyskinesia and reproduction of symptoms. I also recommend that the patient add Miralax to her bowel regimen as she had evidence of moderate stool burden on her CT and states she has hard bowel movements q2-3 days. This may also be contributing to her complaints. Patient is agreeable to the plan and all of her questions were answered. I will see her again after her HIDA scan or call with the results if they are normal.    Electronically signed by Dina Borrego DO  10/21/24

## 2024-11-10 ENCOUNTER — HOSPITAL ENCOUNTER (EMERGENCY)
Facility: HOSPITAL | Age: 89
Discharge: ANOTHER HOSPITAL | End: 2024-11-10
Attending: EMERGENCY MEDICINE | Admitting: EMERGENCY MEDICINE
Payer: MEDICARE

## 2024-11-10 ENCOUNTER — APPOINTMENT (OUTPATIENT)
Dept: CT IMAGING | Facility: HOSPITAL | Age: 89
End: 2024-11-10
Payer: MEDICARE

## 2024-11-10 VITALS
SYSTOLIC BLOOD PRESSURE: 150 MMHG | HEART RATE: 85 BPM | RESPIRATION RATE: 18 BRPM | TEMPERATURE: 97.5 F | HEIGHT: 64 IN | OXYGEN SATURATION: 99 % | BODY MASS INDEX: 17.07 KG/M2 | DIASTOLIC BLOOD PRESSURE: 70 MMHG | WEIGHT: 100 LBS

## 2024-11-10 DIAGNOSIS — D73.89 LESION OF SPLEEN: ICD-10-CM

## 2024-11-10 DIAGNOSIS — K22.89 ESOPHAGEAL MASS: Primary | ICD-10-CM

## 2024-11-10 DIAGNOSIS — R91.8 PULMONARY NODULES: ICD-10-CM

## 2024-11-10 LAB
ALBUMIN SERPL-MCNC: 4 G/DL (ref 3.5–5.2)
ALBUMIN/GLOB SERPL: 1.1 G/DL
ALP SERPL-CCNC: 69 U/L (ref 39–117)
ALT SERPL W P-5'-P-CCNC: <5 U/L (ref 1–33)
ANION GAP SERPL CALCULATED.3IONS-SCNC: 14.7 MMOL/L (ref 5–15)
AST SERPL-CCNC: 14 U/L (ref 1–32)
BASOPHILS # BLD AUTO: 0.02 10*3/MM3 (ref 0–0.2)
BASOPHILS NFR BLD AUTO: 0.3 % (ref 0–1.5)
BILIRUB SERPL-MCNC: 0.3 MG/DL (ref 0–1.2)
BUN SERPL-MCNC: 18 MG/DL (ref 8–23)
BUN/CREAT SERPL: 16.7 (ref 7–25)
CALCIUM SPEC-SCNC: 9.5 MG/DL (ref 8.6–10.5)
CHLORIDE SERPL-SCNC: 101 MMOL/L (ref 98–107)
CO2 SERPL-SCNC: 23.3 MMOL/L (ref 22–29)
CREAT SERPL-MCNC: 1.08 MG/DL (ref 0.57–1)
DEPRECATED RDW RBC AUTO: 46.6 FL (ref 37–54)
EGFRCR SERPLBLD CKD-EPI 2021: 49.8 ML/MIN/1.73
EOSINOPHIL # BLD AUTO: 0.09 10*3/MM3 (ref 0–0.4)
EOSINOPHIL NFR BLD AUTO: 1.2 % (ref 0.3–6.2)
ERYTHROCYTE [DISTWIDTH] IN BLOOD BY AUTOMATED COUNT: 14.1 % (ref 12.3–15.4)
GLOBULIN UR ELPH-MCNC: 3.5 GM/DL
GLUCOSE SERPL-MCNC: 132 MG/DL (ref 65–99)
HCT VFR BLD AUTO: 35.3 % (ref 34–46.6)
HGB BLD-MCNC: 11.6 G/DL (ref 12–15.9)
IMM GRANULOCYTES # BLD AUTO: 0.03 10*3/MM3 (ref 0–0.05)
IMM GRANULOCYTES NFR BLD AUTO: 0.4 % (ref 0–0.5)
LIPASE SERPL-CCNC: 28 U/L (ref 13–60)
LYMPHOCYTES # BLD AUTO: 0.78 10*3/MM3 (ref 0.7–3.1)
LYMPHOCYTES NFR BLD AUTO: 10.7 % (ref 19.6–45.3)
MCH RBC QN AUTO: 29.7 PG (ref 26.6–33)
MCHC RBC AUTO-ENTMCNC: 32.9 G/DL (ref 31.5–35.7)
MCV RBC AUTO: 90.3 FL (ref 79–97)
MONOCYTES # BLD AUTO: 0.52 10*3/MM3 (ref 0.1–0.9)
MONOCYTES NFR BLD AUTO: 7.1 % (ref 5–12)
NEUTROPHILS NFR BLD AUTO: 5.88 10*3/MM3 (ref 1.7–7)
NEUTROPHILS NFR BLD AUTO: 80.3 % (ref 42.7–76)
NRBC BLD AUTO-RTO: 0 /100 WBC (ref 0–0.2)
PLATELET # BLD AUTO: 261 10*3/MM3 (ref 140–450)
PMV BLD AUTO: 9.5 FL (ref 6–12)
POTASSIUM SERPL-SCNC: 4.3 MMOL/L (ref 3.5–5.2)
PROT SERPL-MCNC: 7.5 G/DL (ref 6–8.5)
RBC # BLD AUTO: 3.91 10*6/MM3 (ref 3.77–5.28)
SODIUM SERPL-SCNC: 139 MMOL/L (ref 136–145)
WBC NRBC COR # BLD AUTO: 7.32 10*3/MM3 (ref 3.4–10.8)

## 2024-11-10 PROCEDURE — 70491 CT SOFT TISSUE NECK W/DYE: CPT

## 2024-11-10 PROCEDURE — 25010000002 GLUCAGON (RDNA) PER 1 MG: Performed by: PHYSICIAN ASSISTANT

## 2024-11-10 PROCEDURE — 93005 ELECTROCARDIOGRAM TRACING: CPT | Performed by: PHYSICIAN ASSISTANT

## 2024-11-10 PROCEDURE — 83690 ASSAY OF LIPASE: CPT | Performed by: PHYSICIAN ASSISTANT

## 2024-11-10 PROCEDURE — 99285 EMERGENCY DEPT VISIT HI MDM: CPT | Performed by: EMERGENCY MEDICINE

## 2024-11-10 PROCEDURE — 96374 THER/PROPH/DIAG INJ IV PUSH: CPT

## 2024-11-10 PROCEDURE — 85025 COMPLETE CBC W/AUTO DIFF WBC: CPT | Performed by: PHYSICIAN ASSISTANT

## 2024-11-10 PROCEDURE — 80053 COMPREHEN METABOLIC PANEL: CPT | Performed by: PHYSICIAN ASSISTANT

## 2024-11-10 PROCEDURE — 74177 CT ABD & PELVIS W/CONTRAST: CPT

## 2024-11-10 PROCEDURE — 71260 CT THORAX DX C+: CPT

## 2024-11-10 PROCEDURE — 25510000001 IOPAMIDOL 61 % SOLUTION: Performed by: EMERGENCY MEDICINE

## 2024-11-10 RX ORDER — IOPAMIDOL 612 MG/ML
100 INJECTION, SOLUTION INTRAVASCULAR
Status: COMPLETED | OUTPATIENT
Start: 2024-11-10 | End: 2024-11-10

## 2024-11-10 RX ADMIN — GLUCAGON 1 MG: KIT at 11:46

## 2024-11-10 RX ADMIN — IOPAMIDOL 80 ML: 612 INJECTION, SOLUTION INTRAVENOUS at 13:01

## 2024-11-10 NOTE — ED PROVIDER NOTES
Subjective:  Chief Complaint:  Food bolus    History of Present Illness:  Patient is an 89-year-old female with history of esophageal cancer, hypertension, vertigo presenting to the ER with complaints of dysphagia.  Patient states that she has had worsening dysphagia over the last week.  States that this morning she ate a bite of a croissant and has not been able to hold anything down at all since then.  States that she has had to have her esophagus stretched multiple times in the past.  States she feels like she has something stuck in her chest/upper abdomen.  Denies any additional symptoms or complaints at this time. Patient states esophageal cancer has been in remission. States she has not gotten any imaging or followed up in around 4 years.       Nurses Notes reviewed and agree, including vitals, allergies, social history and prior medical history.     REVIEW OF SYSTEMS: All systems reviewed and not pertinent unless noted.  Review of Systems   HENT:  Positive for trouble swallowing.    Cardiovascular:  Positive for chest pain (from food bolus that is stuck).   All other systems reviewed and are negative.      Past Medical History:   Diagnosis Date    Cancer     esophagus    Hard of hearing     History of blood transfusion     Hypertension     Vertigo        Allergies:    Atorvastatin, Bactrim [sulfamethoxazole-trimethoprim], Ciprofloxacin, and Pantoprazole      Past Surgical History:   Procedure Laterality Date    APPENDECTOMY  approx 6     SECTION      DENTAL PROCEDURE      ENDOSCOPY N/A 2019    Procedure: ESOPHAGOGASTRODUODENOSCOPY with dilation, removal foreign body and cold forcep biopsies;  Surgeon: Rajesh Schultz MD;  Location: [facility] ENDOSCOPY;  Service: Gastroenterology    ENDOSCOPY N/A 2020    Procedure: ESOPHAGOGASTRODUODENOSCOPY WITH DILATATION;  Surgeon: Genia Ricketts MD;  Location: [facility] ENDOSCOPY;  Service: Gastroenterology;  Laterality: N/A;    ENDOSCOPY N/A  "3/19/2022    Procedure: ESOPHAGOGASTRODUODENOSCOPY, removal of food bolus, and balloon dilation;  Surgeon: Genia Ricketts MD;  Location: [facility] ENDOSCOPY;  Service: Gastroenterology;  Laterality: N/A;    ESOPHAGUS SURGERY      Stent placed    GASTROSTOMY FEEDING TUBE INSERTION      HYSTERECTOMY  [id number]    TOTAL KNEE ARTHROPLASTY  approx 2010         Social History     Socioeconomic History    Marital status:    Tobacco Use    Smoking status: Never    Smokeless tobacco: Never   Vaping Use    Vaping status: Never Used   Substance and Sexual Activity    Alcohol use: No    Drug use: No    Sexual activity: Defer         Family History   Problem Relation Age of Onset    Colon cancer Neg Hx        Objective  Physical Exam:  /70   Pulse 85   Temp 97.5 °F (36.4 °C) (Oral)   Resp 18   Ht 162.6 cm (64\")   Wt 45.4 kg (100 lb)   SpO2 99%   BMI 17.16 kg/m²      Physical Exam  Vitals and nursing note reviewed.   Constitutional:       General: She is not in acute distress.     Appearance: She is not toxic-appearing.   HENT:      Head: Normocephalic and atraumatic.      Right Ear: External ear normal.      Left Ear: External ear normal.      Nose: Nose normal.   Eyes:      Extraocular Movements: Extraocular movements intact.      Conjunctiva/sclera: Conjunctivae normal.   Cardiovascular:      Rate and Rhythm: Tachycardia present.   Pulmonary:      Effort: Pulmonary effort is normal. No respiratory distress.   Abdominal:      General: There is no distension.      Palpations: Abdomen is soft.   Musculoskeletal:         General: Normal range of motion.      Cervical back: Normal range of motion and neck supple.   Skin:     General: Skin is warm and dry.   Neurological:      Mental Status: She is alert.         Procedures    ED Course:    ED Course as of 11/10/24 1435   Sun Nov 10, 2024   1323 EKG: I reviewed and independently interpreted the EKG as:  Sinus rhythm rate of 92 bpm, normal axis, normal " intervals, no ST elevation, no T wave inversions [CS]      ED Course User Index  [CS] Gilberto Grider MD       Lab Results (last 24 hours)       Procedure Component Value Units Date/Time    CBC & Differential [[id number]]  (Abnormal) Collected: 11/10/24 1145    Specimen: Blood Updated: 11/10/24 1151    Narrative:      The following orders were created for panel order CBC & Differential.  Procedure                               Abnormality         Status                     ---------                               -----------         ------                     CBC Auto Differential[[id number]]        Abnormal            Final result                 Please view results for these tests on the individual orders.    Comprehensive Metabolic Panel [[id number]]  (Abnormal) Collected: 11/10/24 1145    Specimen: Blood Updated: 11/10/24 1218     Glucose 132 mg/dL      BUN 18 mg/dL      Creatinine 1.08 mg/dL      Sodium 139 mmol/L      Potassium 4.3 mmol/L      Chloride 101 mmol/L      CO2 23.3 mmol/L      Calcium 9.5 mg/dL      Total Protein 7.5 g/dL      Albumin 4.0 g/dL      ALT (SGPT) <5 U/L      AST (SGOT) 14 U/L      Alkaline Phosphatase 69 U/L      Total Bilirubin 0.3 mg/dL      Globulin 3.5 gm/dL      A/G Ratio 1.1 g/dL      BUN/Creatinine Ratio 16.7     Anion Gap 14.7 mmol/L      eGFR 49.8 mL/min/1.73     Narrative:      GFR Normal >60  Chronic Kidney Disease <60  Kidney Failure <15    The GFR formula is only valid for adults with stable renal function between ages 18 and 70.    Lipase [[id number]]  (Normal) Collected: 11/10/24 1145    Specimen: Blood Updated: 11/10/24 1210     Lipase 28 U/L     CBC Auto Differential [[id number]]  (Abnormal) Collected: 11/10/24 1145    Specimen: Blood Updated: 11/10/24 1151     WBC 7.32 10*3/mm3      RBC 3.91 10*6/mm3      Hemoglobin 11.6 g/dL      Hematocrit 35.3 %      MCV 90.3 fL      MCH 29.7 pg      MCHC 32.9 g/dL      RDW 14.1 %      RDW-SD 46.6 fl      MPV 9.5 fL       Platelets 261 10*3/mm3      Neutrophil % 80.3 %      Lymphocyte % 10.7 %      Monocyte % 7.1 %      Eosinophil % 1.2 %      Basophil % 0.3 %      Immature Grans % 0.4 %      Neutrophils, Absolute 5.88 10*3/mm3      Lymphocytes, Absolute 0.78 10*3/mm3      Monocytes, Absolute 0.52 10*3/mm3      Eosinophils, Absolute 0.09 10*3/mm3      Basophils, Absolute 0.02 10*3/mm3      Immature Grans, Absolute 0.03 10*3/mm3      nRBC 0.0 /100 WBC              CT Abdomen Pelvis With Contrast    Result Date: 11/10/2024  PROCEDURE: CT ABDOMEN PELVIS W CONTRAST-  HISTORY: h/o esophageal cancer, 4 years remission, worsening dysphagia X 1 wk, reports food bolus today and unable to hold anything down, feels something is stuck in chest  Comparison: None  FINDINGS: Axial CT images of the abdomen and pelvis were obtained with IV contrast only. Coronal reformatted images were also obtained. This study was performed with techniques to keep radiation doses as low as reasonably achievable, (ALARA). Individualized dose reduction techniques using automated exposure control or adjustment of mA and/or kV according to the patient size were employed.  Mild scarring is noted in the lung bases. The liver has an unremarkable appearance, without evidence of mass. There is mild nonspecific gallbladder wall thickening. There is no evidence of biliary ductal dilatation. The pancreas appears normal. A 10 mm mass is seen in the inferior spleen with a nonspecific appearance. There is mild left renal scarring. A small right renal cyst is present. There are moderate vascular calcifications. There is no evidence of adenopathy. No abnormal fluid collection is seen. No localized inflammatory process is identified.  Images of the pelvis reveal no evidence of mass or adenopathy. No abnormal fluid collection is seen. The appendix is not well visualized. Multiple diverticula are seen in the sigmoid colon. Postoperative changes are seen from hysterectomy. Degenerative  changes are noted in the lumbar spine and pelvis.      Impression: 10 mm nonspecific mass in the inferior spleen. This could be further evaluated with follow-up CT in 6 months.      CTDI: 5.57 mGy DLP:774.49 mGy.cm  This report was signed and finalized on 11/10/2024 1:25 PM by Mk Burks MD.      CT Chest With Contrast Diagnostic    Result Date: 11/10/2024  PROCEDURE: CT CHEST W CONTRAST DIAGNOSTIC-  HISTORY: h/o esophageal cancer, 4 years remission, worsening dysphagia X 1 wk, reports food bolus today and unable to hold anything down, feels something is stuck in chest  COMPARISON: None  FINDINGS: Axial CT images of the chest were obtained with contrast. Coronal and sagittal reformatted images were also obtained. This study was performed with techniques to keep radiation doses as low as reasonably achievable, (ALARA). Individualized dose reduction techniques using automated exposure control or adjustment of mA and/or kV according to the patient size were employed.  A 3.2 x 2.4 cm mass is seen in the distal thoracic esophagus consistent with neoplastic involvement. This is seen at the level of the superior aspect of the left atrium. Air and fluid is seen in the more proximal thoracic esophagus. A borderline precarinal node is seen which measures 13 mm. No axillary mass or adenopathy is identified. Mild pulmonary scarring is noted. There are several less than 1 cm noncalcified pulmonary nodules. A 3 mm nodule is seen in the posterior left lower lobe on image #73 and a 5 mm nodule is seen in the lateral left lower lobe on image #85. Several other small pulmonary nodules are seen. There is no evidence of pleural effusion. No focal chest wall abnormality is seen.       Impression: Impression: 3.2 cm distal esophageal mass consistent with neoplastic involvement. Recommend correlation with upper endoscopy.  Several small nonspecific pulmonary nodules may represent granulomas or metastases. These could be further  evaluated with follow-up chest CT in 3-6 months.      CTDI: DLP:  This report was signed and finalized on 11/10/2024 1:23 PM by Mk Burks MD.      CT Soft Tissue Neck With Contrast    Result Date: 11/10/2024  PROCEDURE: CT SOFT TISSUE NECK W CONTRAST-  HISTORY: h/o esophageal cancer, 4 years remission, worsening dysphagia X 1 wk, reports food bolus today and unable to hold anything down, feels something is stuck in chest  TECHNIQUE:  Thin section axial CT images were obtained through the neck after intravenous contrast administration. Sagittal and coronal reformatted images were also obtained. This study was performed with techniques to keep radiation doses as low as reasonably achievable, (ALARA). Individualized dose reduction techniques using automated exposure control or adjustment of mA and/or kV according to the patient size were employed.  COMPARISON: None.  FINDINGS: The nasopharynx, pharynx and larynx are normal. There is no mass or adenopathy. The visualized sinuses are clear. No osseous abnormality. No adenopathy is identified. No abnormal fluid collection is seen. Degenerative changes are noted in the cervical spine.       Impression: No mass or localized inflammatory process identified..      CTDI: 5.57 mGy DLP:774.49 mGy.cm   This report was signed and finalized on 11/10/2024 1:18 PM by Mk Burks MD.          MDM  Patient evaluated in the ER for dysphagia that is worsened over the last week with a food bolus this morning making it impossible for the patient to swallow.  She is mildly tachycardic but otherwise hemodynamically stable, afebrile, nontoxic-appearing on exam.  Differential diagnosis includes and is not limited to food bolus, esophageal stricture, mass, cardiac arrhythmia, electrolyte abnormalities, among others.  Initial plan includes CBC, CMP, lipase, EKG, CT soft tissue neck, chest, abdomen pelvis.  Will give 1 mg glucagon initially to try and resolve food bolus.    Labs are  unremarkable.  CT imaging shows a 3.2 cm distal esophageal mass consistent with neoplastic involvement with recommended correlation with upper endoscopy.  Also several small nonspecific pulmonary nodules which may represent granulomas or metastasis.  Recommended repeat CT in 3 to 6 months.  Given that patient is unable to swallow, will contact  for transfer as this is where she previously received treatment for esophageal cancer in the same area.  CT abdomen pelvis also showed a nonspecific spleen mass measured at 10 mm with recommended repeat CT in 6 months.    Spoke with , Dr. Stewart, who has accepted the patient for transfer to [facility] ED.  Called radiology to have disc sent over with images.  Patient's  would prefer to take her by vehicle.  Feel this is reasonable as patient is hemodynamically stable.  Did advise them to go straight to [facility] ER.  Patient transferred in stable condition.    Final diagnoses:   Esophageal mass   Pulmonary nodules   Lesion of spleen          Faith Fagan, JANA  11/10/24 9911

## 2024-11-10 NOTE — ED NOTES
Att I contacted [facility] for possible transfer of this pt to them. Spoke to Ernestina and we are awaiting a call back att

## (undated) DEVICE — DEV INFL ALLIANCE2 SYS

## (undated) DEVICE — FRCP BX RADJAW4 NDL 2.8 240 STD OG

## (undated) DEVICE — Device

## (undated) DEVICE — SUCTION CANISTER, 1500CC, RIGID: Brand: DEROYAL

## (undated) DEVICE — HYBRID TUBING/CAP SET FOR OLYMPUS® SCOPES: Brand: ERBE

## (undated) DEVICE — ENDOSCOPY PORT CONNECTOR FOR OLYMPUS® SCOPES: Brand: ERBE

## (undated) DEVICE — ESOPHAGEAL BALLOON DILATATION CATHETER: Brand: CRE FIXED WIRE

## (undated) DEVICE — FRCP BIOP COLD ENDOJAW ALLGTR W/NDL 2.8X2300MM BLU

## (undated) DEVICE — A FLEXIBLE MANUAL DEVICE INTENDED TO BE USED IN COMBINATION WITH A COMPATIBLE ENDOSCOPE TO GRASP FOREIGN BODIES. IT IS CONSTRUCTED OF A FLEXIBLE METAL COIL AND PLASTIC TUBE WHOSE DISTAL END IS EQUIPPED WITH GRASPING TONGS WHICH ARE OPERATED BY A CONTROL HANDLE ATTACHED AT THE PROXIMAL END OF THE INSTRUMENT. IT IS INTRODUCED INTO THE BODY CAVITY THROUGH THE WORKING CHANNEL OF THE ENDOSCOPE. THIS IS A SINGLE-USE DEVICE.: Brand: HOBBS MEDICAL GRASPING FORCEPS

## (undated) DEVICE — 2000CC GUARDIAN II: Brand: GUARDIAN

## (undated) DEVICE — KT ORCA VLV SXN AIR/H2O W/SEAL 1P/U STRL

## (undated) DEVICE — VLV SXN AIR/H2O ORCAPOD3 1P/U STRL

## (undated) DEVICE — ENDOGATOR AUXILIARY WATER JET CONNECTOR: Brand: ENDOGATOR

## (undated) DEVICE — CONMED SCOPE SAVER BITE BLOCK, 20X27 MM: Brand: SCOPE SAVER

## (undated) DEVICE — SINGLE-USE POLYPECTOMY SNARE: Brand: CAPTIVATOR II